# Patient Record
Sex: FEMALE | Race: BLACK OR AFRICAN AMERICAN | Employment: UNEMPLOYED
[De-identification: names, ages, dates, MRNs, and addresses within clinical notes are randomized per-mention and may not be internally consistent; named-entity substitution may affect disease eponyms.]

---

## 2024-09-24 ENCOUNTER — APPOINTMENT (OUTPATIENT)
Facility: HOSPITAL | Age: 39
DRG: 194 | End: 2024-09-24
Payer: MEDICAID

## 2024-09-24 ENCOUNTER — HOSPITAL ENCOUNTER (INPATIENT)
Facility: HOSPITAL | Age: 39
LOS: 7 days | Discharge: HOME | DRG: 194 | End: 2024-10-01
Attending: EMERGENCY MEDICINE | Admitting: INTERNAL MEDICINE
Payer: MEDICAID

## 2024-09-24 DIAGNOSIS — I50.33 ACUTE ON CHRONIC DIASTOLIC CONGESTIVE HEART FAILURE (HCC): ICD-10-CM

## 2024-09-24 DIAGNOSIS — I50.9 NEW ONSET OF CONGESTIVE HEART FAILURE (HCC): Primary | ICD-10-CM

## 2024-09-24 DIAGNOSIS — J96.01 ACUTE RESPIRATORY FAILURE WITH HYPOXIA: ICD-10-CM

## 2024-09-24 DIAGNOSIS — R06.02 SHORTNESS OF BREATH: ICD-10-CM

## 2024-09-24 LAB
ALBUMIN SERPL-MCNC: 2.7 G/DL (ref 3.4–5)
ALBUMIN/GLOB SERPL: 0.6 (ref 0.8–1.7)
ALP SERPL-CCNC: 119 U/L (ref 45–117)
ALT SERPL-CCNC: 73 U/L (ref 13–56)
ANION GAP SERPL CALC-SCNC: 7 MMOL/L (ref 3–18)
AST SERPL-CCNC: 144 U/L (ref 10–38)
BASOPHILS # BLD: 0 K/UL (ref 0–0.1)
BASOPHILS NFR BLD: 0 % (ref 0–2)
BILIRUB SERPL-MCNC: 0.7 MG/DL (ref 0.2–1)
BUN SERPL-MCNC: 26 MG/DL (ref 7–18)
BUN/CREAT SERPL: 14 (ref 12–20)
CALCIUM SERPL-MCNC: 8.4 MG/DL (ref 8.5–10.1)
CHLORIDE SERPL-SCNC: 107 MMOL/L (ref 100–111)
CO2 SERPL-SCNC: 22 MMOL/L (ref 21–32)
CREAT SERPL-MCNC: 1.83 MG/DL (ref 0.6–1.3)
DIFFERENTIAL METHOD BLD: ABNORMAL
EOSINOPHIL # BLD: 0 K/UL (ref 0–0.4)
EOSINOPHIL NFR BLD: 0 % (ref 0–5)
ERYTHROCYTE [DISTWIDTH] IN BLOOD BY AUTOMATED COUNT: 17.1 % (ref 11.6–14.5)
ETHANOL SERPL-MCNC: <3 MG/DL (ref 0–3)
FLUAV RNA SPEC QL NAA+PROBE: NOT DETECTED
FLUBV RNA SPEC QL NAA+PROBE: NOT DETECTED
GLOBULIN SER CALC-MCNC: 4.2 G/DL (ref 2–4)
GLUCOSE SERPL-MCNC: 184 MG/DL (ref 74–99)
HCT VFR BLD AUTO: 24.1 % (ref 35–45)
HGB BLD-MCNC: 7.4 G/DL (ref 12–16)
IMM GRANULOCYTES # BLD AUTO: 0.1 K/UL (ref 0–0.04)
IMM GRANULOCYTES NFR BLD AUTO: 0 % (ref 0–0.5)
LYMPHOCYTES # BLD: 1.9 K/UL (ref 0.9–3.6)
LYMPHOCYTES NFR BLD: 15 % (ref 21–52)
MAGNESIUM SERPL-MCNC: 1.8 MG/DL (ref 1.6–2.6)
MCH RBC QN AUTO: 25 PG (ref 24–34)
MCHC RBC AUTO-ENTMCNC: 30.7 G/DL (ref 31–37)
MCV RBC AUTO: 81.4 FL (ref 78–100)
MONOCYTES # BLD: 0.7 K/UL (ref 0.05–1.2)
MONOCYTES NFR BLD: 6 % (ref 3–10)
NEUTS SEG # BLD: 9.7 K/UL (ref 1.8–8)
NEUTS SEG NFR BLD: 78 % (ref 40–73)
NRBC # BLD: 0 K/UL (ref 0–0.01)
NRBC BLD-RTO: 0 PER 100 WBC
NT PRO BNP: ABNORMAL PG/ML (ref 0–450)
PLATELET # BLD AUTO: 314 K/UL (ref 135–420)
PMV BLD AUTO: 10.5 FL (ref 9.2–11.8)
POTASSIUM SERPL-SCNC: 4.7 MMOL/L (ref 3.5–5.5)
PROT SERPL-MCNC: 6.9 G/DL (ref 6.4–8.2)
RBC # BLD AUTO: 2.96 M/UL (ref 4.2–5.3)
SARS-COV-2 RNA RESP QL NAA+PROBE: NOT DETECTED
SODIUM SERPL-SCNC: 136 MMOL/L (ref 136–145)
SOURCE: NORMAL
TROPONIN I SERPL HS-MCNC: 73 NG/L (ref 0–54)
WBC # BLD AUTO: 12.4 K/UL (ref 4.6–13.2)

## 2024-09-24 PROCEDURE — 2700000000 HC OXYGEN THERAPY PER DAY

## 2024-09-24 PROCEDURE — 94762 N-INVAS EAR/PLS OXIMTRY CONT: CPT

## 2024-09-24 PROCEDURE — 84425 ASSAY OF VITAMIN B-1: CPT

## 2024-09-24 PROCEDURE — 83605 ASSAY OF LACTIC ACID: CPT

## 2024-09-24 PROCEDURE — 96375 TX/PRO/DX INJ NEW DRUG ADDON: CPT

## 2024-09-24 PROCEDURE — 5A0945A ASSISTANCE WITH RESPIRATORY VENTILATION, 24-96 CONSECUTIVE HOURS, HIGH NASAL FLOW/VELOCITY: ICD-10-PCS | Performed by: INTERNAL MEDICINE

## 2024-09-24 PROCEDURE — 71045 X-RAY EXAM CHEST 1 VIEW: CPT

## 2024-09-24 PROCEDURE — 82607 VITAMIN B-12: CPT

## 2024-09-24 PROCEDURE — 99285 EMERGENCY DEPT VISIT HI MDM: CPT

## 2024-09-24 PROCEDURE — 2580000003 HC RX 258: Performed by: EMERGENCY MEDICINE

## 2024-09-24 PROCEDURE — 83880 ASSAY OF NATRIURETIC PEPTIDE: CPT

## 2024-09-24 PROCEDURE — 87636 SARSCOV2 & INF A&B AMP PRB: CPT

## 2024-09-24 PROCEDURE — 82803 BLOOD GASES ANY COMBINATION: CPT

## 2024-09-24 PROCEDURE — 83735 ASSAY OF MAGNESIUM: CPT

## 2024-09-24 PROCEDURE — 94640 AIRWAY INHALATION TREATMENT: CPT

## 2024-09-24 PROCEDURE — 84484 ASSAY OF TROPONIN QUANT: CPT

## 2024-09-24 PROCEDURE — 93005 ELECTROCARDIOGRAM TRACING: CPT | Performed by: EMERGENCY MEDICINE

## 2024-09-24 PROCEDURE — 80053 COMPREHEN METABOLIC PANEL: CPT

## 2024-09-24 PROCEDURE — 96365 THER/PROPH/DIAG IV INF INIT: CPT

## 2024-09-24 PROCEDURE — 96374 THER/PROPH/DIAG INJ IV PUSH: CPT

## 2024-09-24 PROCEDURE — 6370000000 HC RX 637 (ALT 250 FOR IP): Performed by: EMERGENCY MEDICINE

## 2024-09-24 PROCEDURE — 6360000002 HC RX W HCPCS: Performed by: EMERGENCY MEDICINE

## 2024-09-24 PROCEDURE — 1100000000 HC RM PRIVATE

## 2024-09-24 PROCEDURE — 36600 WITHDRAWAL OF ARTERIAL BLOOD: CPT

## 2024-09-24 PROCEDURE — 99223 1ST HOSP IP/OBS HIGH 75: CPT | Performed by: INTERNAL MEDICINE

## 2024-09-24 PROCEDURE — 85025 COMPLETE CBC W/AUTO DIFF WBC: CPT

## 2024-09-24 PROCEDURE — 87040 BLOOD CULTURE FOR BACTERIA: CPT

## 2024-09-24 PROCEDURE — 82746 ASSAY OF FOLIC ACID SERUM: CPT

## 2024-09-24 PROCEDURE — 82077 ASSAY SPEC XCP UR&BREATH IA: CPT

## 2024-09-24 RX ORDER — IPRATROPIUM BROMIDE AND ALBUTEROL SULFATE 2.5; .5 MG/3ML; MG/3ML
1 SOLUTION RESPIRATORY (INHALATION)
Status: COMPLETED | OUTPATIENT
Start: 2024-09-24 | End: 2024-09-24

## 2024-09-24 RX ORDER — THIAMINE HYDROCHLORIDE 100 MG/ML
100 INJECTION, SOLUTION INTRAMUSCULAR; INTRAVENOUS ONCE
Status: COMPLETED | OUTPATIENT
Start: 2024-09-25 | End: 2024-09-25

## 2024-09-24 RX ORDER — INSULIN LISPRO 100 [IU]/ML
0-4 INJECTION, SOLUTION INTRAVENOUS; SUBCUTANEOUS NIGHTLY
Status: DISCONTINUED | OUTPATIENT
Start: 2024-09-25 | End: 2024-10-01 | Stop reason: HOSPADM

## 2024-09-24 RX ORDER — FUROSEMIDE 10 MG/ML
40 INJECTION INTRAMUSCULAR; INTRAVENOUS ONCE
Status: COMPLETED | OUTPATIENT
Start: 2024-09-24 | End: 2024-09-24

## 2024-09-24 RX ORDER — INSULIN LISPRO 100 [IU]/ML
0-4 INJECTION, SOLUTION INTRAVENOUS; SUBCUTANEOUS
Status: DISCONTINUED | OUTPATIENT
Start: 2024-09-25 | End: 2024-10-01 | Stop reason: HOSPADM

## 2024-09-24 RX ADMIN — AZITHROMYCIN DIHYDRATE 500 MG: 500 INJECTION, POWDER, LYOPHILIZED, FOR SOLUTION INTRAVENOUS at 22:37

## 2024-09-24 RX ADMIN — FUROSEMIDE 40 MG: 10 INJECTION, SOLUTION INTRAMUSCULAR; INTRAVENOUS at 23:20

## 2024-09-24 RX ADMIN — IPRATROPIUM BROMIDE AND ALBUTEROL SULFATE 1 DOSE: .5; 3 SOLUTION RESPIRATORY (INHALATION) at 20:21

## 2024-09-24 RX ADMIN — WATER 1000 MG: 1 INJECTION INTRAMUSCULAR; INTRAVENOUS; SUBCUTANEOUS at 22:30

## 2024-09-24 ASSESSMENT — PAIN DESCRIPTION - FREQUENCY
FREQUENCY: INTERMITTENT
FREQUENCY: CONTINUOUS

## 2024-09-24 ASSESSMENT — PAIN DESCRIPTION - PAIN TYPE
TYPE: ACUTE PAIN
TYPE: ACUTE PAIN

## 2024-09-24 ASSESSMENT — PAIN SCALES - GENERAL
PAINLEVEL_OUTOF10: 7
PAINLEVEL_OUTOF10: 10

## 2024-09-24 ASSESSMENT — PAIN DESCRIPTION - LOCATION
LOCATION: ABDOMEN;BACK
LOCATION: CHEST

## 2024-09-24 ASSESSMENT — PAIN - FUNCTIONAL ASSESSMENT: PAIN_FUNCTIONAL_ASSESSMENT: 0-10

## 2024-09-25 PROBLEM — I50.9 NEW ONSET OF CONGESTIVE HEART FAILURE (HCC): Status: ACTIVE | Noted: 2024-09-25

## 2024-09-25 LAB
ALBUMIN SERPL-MCNC: 2.5 G/DL (ref 3.4–5)
ALBUMIN/GLOB SERPL: 0.6 (ref 0.8–1.7)
ALP SERPL-CCNC: 108 U/L (ref 45–117)
ALT SERPL-CCNC: 60 U/L (ref 13–56)
AMPHET UR QL SCN: NEGATIVE
ANION GAP SERPL CALC-SCNC: 6 MMOL/L (ref 3–18)
APPEARANCE UR: CLEAR
ARTERIAL PATENCY WRIST A: POSITIVE
AST SERPL-CCNC: 101 U/L (ref 10–38)
BACTERIA URNS QL MICRO: ABNORMAL /HPF
BARBITURATES UR QL SCN: NEGATIVE
BASE DEFICIT BLD-SCNC: 3.8 MMOL/L
BASE DEFICIT BLD-SCNC: 4.1 MMOL/L
BASE DEFICIT BLDV-SCNC: 4.1 MMOL/L
BASE EXCESS BLD CALC-SCNC: 3.2 MMOL/L
BASOPHILS # BLD: 0 K/UL (ref 0–0.1)
BASOPHILS NFR BLD: 0 % (ref 0–2)
BDY SITE: ABNORMAL
BENZODIAZ UR QL: NEGATIVE
BILIRUB SERPL-MCNC: 0.6 MG/DL (ref 0.2–1)
BILIRUB UR QL: NEGATIVE
BODY TEMPERATURE: 98.9
BUN SERPL-MCNC: 26 MG/DL (ref 7–18)
BUN/CREAT SERPL: 16 (ref 12–20)
CALCIUM SERPL-MCNC: 8.8 MG/DL (ref 8.5–10.1)
CANNABINOIDS UR QL SCN: NEGATIVE
CHLORIDE SERPL-SCNC: 107 MMOL/L (ref 100–111)
CO2 SERPL-SCNC: 24 MMOL/L (ref 21–32)
COCAINE UR QL SCN: NEGATIVE
COLOR UR: YELLOW
CREAT SERPL-MCNC: 1.6 MG/DL (ref 0.6–1.3)
D DIMER PPP FEU-MCNC: 9.04 UG/ML(FEU)
DIFFERENTIAL METHOD BLD: ABNORMAL
EKG ATRIAL RATE: 112 BPM
EKG ATRIAL RATE: 116 BPM
EKG DIAGNOSIS: NORMAL
EKG DIAGNOSIS: NORMAL
EKG P AXIS: 27 DEGREES
EKG P AXIS: 28 DEGREES
EKG P-R INTERVAL: 144 MS
EKG P-R INTERVAL: 150 MS
EKG Q-T INTERVAL: 342 MS
EKG Q-T INTERVAL: 350 MS
EKG QRS DURATION: 72 MS
EKG QRS DURATION: 74 MS
EKG QTC CALCULATION (BAZETT): 475 MS
EKG QTC CALCULATION (BAZETT): 477 MS
EKG R AXIS: 30 DEGREES
EKG R AXIS: 30 DEGREES
EKG T AXIS: 35 DEGREES
EKG T AXIS: 37 DEGREES
EKG VENTRICULAR RATE: 112 BPM
EKG VENTRICULAR RATE: 116 BPM
EOSINOPHIL # BLD: 0.1 K/UL (ref 0–0.4)
EOSINOPHIL NFR BLD: 1 % (ref 0–5)
EPITH CASTS URNS QL MICRO: ABNORMAL /LPF (ref 0–5)
ERYTHROCYTE [DISTWIDTH] IN BLOOD BY AUTOMATED COUNT: 16.6 % (ref 11.6–14.5)
FOLATE SERPL-MCNC: 15.2 NG/ML (ref 3.1–17.5)
GAS FLOW.O2 O2 DELIVERY SYS: ABNORMAL
GAS FLOW.O2 SETTING OXYMISER: 10 BPM
GLOBULIN SER CALC-MCNC: 3.9 G/DL (ref 2–4)
GLUCOSE BLD STRIP.AUTO-MCNC: 118 MG/DL (ref 70–110)
GLUCOSE BLD STRIP.AUTO-MCNC: 122 MG/DL (ref 70–110)
GLUCOSE BLD STRIP.AUTO-MCNC: 142 MG/DL (ref 70–110)
GLUCOSE BLD STRIP.AUTO-MCNC: 157 MG/DL (ref 70–110)
GLUCOSE BLD STRIP.AUTO-MCNC: 162 MG/DL (ref 70–110)
GLUCOSE SERPL-MCNC: 150 MG/DL (ref 74–99)
GLUCOSE UR STRIP.AUTO-MCNC: NEGATIVE MG/DL
HCO3 BLD-SCNC: 20.1 MMOL/L (ref 21–28)
HCO3 BLD-SCNC: 20.5 MMOL/L (ref 21–28)
HCO3 BLD-SCNC: 27.6 MMOL/L (ref 21–28)
HCO3 BLDV-SCNC: 21.5 MMOL/L (ref 23–28)
HCT VFR BLD AUTO: 22.5 % (ref 35–45)
HGB BLD-MCNC: 7 G/DL (ref 12–16)
HGB UR QL STRIP: ABNORMAL
IMM GRANULOCYTES # BLD AUTO: 0.1 K/UL (ref 0–0.04)
IMM GRANULOCYTES NFR BLD AUTO: 0 % (ref 0–0.5)
IPAP/PIP/HIGH PEEP: 17
KETONES UR QL STRIP.AUTO: NEGATIVE MG/DL
LACTATE BLD-SCNC: 4.56 MMOL/L (ref 0.4–2)
LACTATE SERPL-SCNC: 2.3 MMOL/L (ref 0.4–2)
LEUKOCYTE ESTERASE UR QL STRIP.AUTO: ABNORMAL
LYMPHOCYTES # BLD: 1.4 K/UL (ref 0.9–3.6)
LYMPHOCYTES NFR BLD: 11 % (ref 21–52)
Lab: NORMAL
MCH RBC QN AUTO: 24.8 PG (ref 24–34)
MCHC RBC AUTO-ENTMCNC: 31.1 G/DL (ref 31–37)
MCV RBC AUTO: 79.8 FL (ref 78–100)
METHADONE UR QL: NEGATIVE
MONOCYTES # BLD: 0.8 K/UL (ref 0.05–1.2)
MONOCYTES NFR BLD: 6 % (ref 3–10)
NEUTS SEG # BLD: 10.1 K/UL (ref 1.8–8)
NEUTS SEG NFR BLD: 81 % (ref 40–73)
NITRITE UR QL STRIP.AUTO: NEGATIVE
NRBC # BLD: 0.02 K/UL (ref 0–0.01)
NRBC BLD-RTO: 0.2 PER 100 WBC
O2/TOTAL GAS SETTING VFR VENT: 100 %
OPIATES UR QL: NEGATIVE
PCO2 BLD: 32.4 MMHG (ref 35–48)
PCO2 BLD: 34.1 MMHG (ref 35–48)
PCO2 BLD: 40.5 MMHG (ref 35–48)
PCO2 BLDV: 40.8 MMHG (ref 41–51)
PCP UR QL: NEGATIVE
PEEP RESPIRATORY: 10 CMH2O
PH BLD: 7.39 (ref 7.35–7.45)
PH BLD: 7.4 (ref 7.35–7.45)
PH BLD: 7.44 (ref 7.35–7.45)
PH BLDV: 7.33 (ref 7.32–7.42)
PH UR STRIP: 7.5 (ref 5–8)
PLATELET # BLD AUTO: 294 K/UL (ref 135–420)
PMV BLD AUTO: 10.7 FL (ref 9.2–11.8)
PO2 BLD: 44 MMHG (ref 83–108)
PO2 BLD: 65 MMHG (ref 83–108)
PO2 BLD: 66 MMHG (ref 83–108)
PO2 BLDV: 22 MMHG (ref 25–40)
POTASSIUM SERPL-SCNC: 4.2 MMOL/L (ref 3.5–5.5)
PRESSURE SUPPORT SETTING VENT: 6 CMH2O
PROT SERPL-MCNC: 6.4 G/DL (ref 6.4–8.2)
PROT UR STRIP-MCNC: 30 MG/DL
RBC # BLD AUTO: 2.82 M/UL (ref 4.2–5.3)
RBC #/AREA URNS HPF: ABNORMAL /HPF (ref 0–5)
RESPIRATORY RATE, POC: 20 (ref 5–40)
RESPIRATORY RATE, POC: 21 (ref 5–40)
RESPIRATORY RATE, POC: 22 (ref 5–40)
SAO2 % BLD: 80.7 % (ref 92–97)
SAO2 % BLD: 92.3 % (ref 92–97)
SAO2 % BLD: 93.5 % (ref 92–97)
SAO2 % BLDV: 34 % (ref 65–88)
SERVICE CMNT-IMP: ABNORMAL
SODIUM SERPL-SCNC: 137 MMOL/L (ref 136–145)
SP GR UR REFRACTOMETRY: 1.01 (ref 1–1.03)
SPECIMEN TYPE: ABNORMAL
TROPONIN I SERPL HS-MCNC: 26 NG/L (ref 0–54)
TROPONIN I SERPL HS-MCNC: 42 NG/L (ref 0–54)
TROPONIN I SERPL HS-MCNC: 64 NG/L (ref 0–54)
TSH SERPL DL<=0.05 MIU/L-ACNC: 1.72 UIU/ML (ref 0.36–3.74)
UROBILINOGEN UR QL STRIP.AUTO: 1 EU/DL (ref 0.2–1)
VENTILATION MODE VENT: ABNORMAL
VIT B12 SERPL-MCNC: 344 PG/ML (ref 211–911)
VT SETTING VENT: 581 ML
WBC # BLD AUTO: 12.4 K/UL (ref 4.6–13.2)
WBC URNS QL MICRO: ABNORMAL /HPF (ref 0–4)

## 2024-09-25 PROCEDURE — 2580000003 HC RX 258: Performed by: INTERNAL MEDICINE

## 2024-09-25 PROCEDURE — 85025 COMPLETE CBC W/AUTO DIFF WBC: CPT

## 2024-09-25 PROCEDURE — 81001 URINALYSIS AUTO W/SCOPE: CPT

## 2024-09-25 PROCEDURE — 82962 GLUCOSE BLOOD TEST: CPT

## 2024-09-25 PROCEDURE — 2500000003 HC RX 250 WO HCPCS: Performed by: STUDENT IN AN ORGANIZED HEALTH CARE EDUCATION/TRAINING PROGRAM

## 2024-09-25 PROCEDURE — 6370000000 HC RX 637 (ALT 250 FOR IP): Performed by: INTERNAL MEDICINE

## 2024-09-25 PROCEDURE — 80053 COMPREHEN METABOLIC PANEL: CPT

## 2024-09-25 PROCEDURE — 94660 CPAP INITIATION&MGMT: CPT

## 2024-09-25 PROCEDURE — 6360000002 HC RX W HCPCS: Performed by: EMERGENCY MEDICINE

## 2024-09-25 PROCEDURE — 6360000002 HC RX W HCPCS: Performed by: STUDENT IN AN ORGANIZED HEALTH CARE EDUCATION/TRAINING PROGRAM

## 2024-09-25 PROCEDURE — 94664 DEMO&/EVAL PT USE INHALER: CPT

## 2024-09-25 PROCEDURE — 83605 ASSAY OF LACTIC ACID: CPT

## 2024-09-25 PROCEDURE — 2140000001 HC CVICU INTERMEDIATE R&B

## 2024-09-25 PROCEDURE — 36600 WITHDRAWAL OF ARTERIAL BLOOD: CPT

## 2024-09-25 PROCEDURE — 94761 N-INVAS EAR/PLS OXIMETRY MLT: CPT

## 2024-09-25 PROCEDURE — 6360000002 HC RX W HCPCS: Performed by: INTERNAL MEDICINE

## 2024-09-25 PROCEDURE — 36415 COLL VENOUS BLD VENIPUNCTURE: CPT

## 2024-09-25 PROCEDURE — 2700000000 HC OXYGEN THERAPY PER DAY

## 2024-09-25 PROCEDURE — 99223 1ST HOSP IP/OBS HIGH 75: CPT | Performed by: INTERNAL MEDICINE

## 2024-09-25 PROCEDURE — 85379 FIBRIN DEGRADATION QUANT: CPT

## 2024-09-25 PROCEDURE — 99222 1ST HOSP IP/OBS MODERATE 55: CPT | Performed by: INTERNAL MEDICINE

## 2024-09-25 PROCEDURE — 5A09457 ASSISTANCE WITH RESPIRATORY VENTILATION, 24-96 CONSECUTIVE HOURS, CONTINUOUS POSITIVE AIRWAY PRESSURE: ICD-10-PCS | Performed by: INTERNAL MEDICINE

## 2024-09-25 PROCEDURE — 93010 ELECTROCARDIOGRAM REPORT: CPT | Performed by: INTERNAL MEDICINE

## 2024-09-25 PROCEDURE — 84484 ASSAY OF TROPONIN QUANT: CPT

## 2024-09-25 PROCEDURE — 84443 ASSAY THYROID STIM HORMONE: CPT

## 2024-09-25 PROCEDURE — 80307 DRUG TEST PRSMV CHEM ANLYZR: CPT

## 2024-09-25 RX ORDER — METOCLOPRAMIDE 10 MG/1
10 TABLET ORAL
Status: DISCONTINUED | OUTPATIENT
Start: 2024-09-25 | End: 2024-09-25

## 2024-09-25 RX ORDER — MORPHINE SULFATE 2 MG/ML
2 INJECTION, SOLUTION INTRAMUSCULAR; INTRAVENOUS
Status: COMPLETED | OUTPATIENT
Start: 2024-09-25 | End: 2024-09-25

## 2024-09-25 RX ORDER — MORPHINE SULFATE 2 MG/ML
1 INJECTION, SOLUTION INTRAMUSCULAR; INTRAVENOUS EVERY 4 HOURS PRN
Status: COMPLETED | OUTPATIENT
Start: 2024-09-25 | End: 2024-09-26

## 2024-09-25 RX ORDER — POLYETHYLENE GLYCOL 3350 17 G/17G
17 POWDER, FOR SOLUTION ORAL DAILY PRN
Status: DISCONTINUED | OUTPATIENT
Start: 2024-09-25 | End: 2024-10-01 | Stop reason: HOSPADM

## 2024-09-25 RX ORDER — INSULIN GLARGINE 100 [IU]/ML
16 INJECTION, SOLUTION SUBCUTANEOUS EVERY MORNING
Status: DISCONTINUED | OUTPATIENT
Start: 2024-09-25 | End: 2024-09-27

## 2024-09-25 RX ORDER — FUROSEMIDE 10 MG/ML
40 INJECTION INTRAMUSCULAR; INTRAVENOUS 2 TIMES DAILY
Status: DISCONTINUED | OUTPATIENT
Start: 2024-09-25 | End: 2024-09-26

## 2024-09-25 RX ORDER — METOCLOPRAMIDE HYDROCHLORIDE 5 MG/ML
10 INJECTION INTRAMUSCULAR; INTRAVENOUS 3 TIMES DAILY
Status: DISCONTINUED | OUTPATIENT
Start: 2024-09-25 | End: 2024-09-29

## 2024-09-25 RX ORDER — POTASSIUM CHLORIDE 1500 MG/1
40 TABLET, EXTENDED RELEASE ORAL PRN
Status: DISCONTINUED | OUTPATIENT
Start: 2024-09-25 | End: 2024-10-01 | Stop reason: HOSPADM

## 2024-09-25 RX ORDER — ONDANSETRON 4 MG/1
4 TABLET, ORALLY DISINTEGRATING ORAL EVERY 8 HOURS PRN
Status: DISCONTINUED | OUTPATIENT
Start: 2024-09-25 | End: 2024-10-01 | Stop reason: HOSPADM

## 2024-09-25 RX ORDER — POTASSIUM CHLORIDE 7.45 MG/ML
10 INJECTION INTRAVENOUS PRN
Status: DISCONTINUED | OUTPATIENT
Start: 2024-09-25 | End: 2024-10-01 | Stop reason: HOSPADM

## 2024-09-25 RX ORDER — ACETAMINOPHEN 650 MG/1
650 SUPPOSITORY RECTAL EVERY 6 HOURS PRN
Status: DISCONTINUED | OUTPATIENT
Start: 2024-09-25 | End: 2024-10-01 | Stop reason: HOSPADM

## 2024-09-25 RX ORDER — ISOSORBIDE DINITRATE 5 MG/1
10 TABLET ORAL 3 TIMES DAILY
Status: DISCONTINUED | OUTPATIENT
Start: 2024-09-25 | End: 2024-10-01 | Stop reason: HOSPADM

## 2024-09-25 RX ORDER — AMLODIPINE BESYLATE 5 MG/1
5 TABLET ORAL DAILY
Status: DISCONTINUED | OUTPATIENT
Start: 2024-09-25 | End: 2024-09-29

## 2024-09-25 RX ORDER — ACETAMINOPHEN 325 MG/1
650 TABLET ORAL EVERY 6 HOURS PRN
Status: DISCONTINUED | OUTPATIENT
Start: 2024-09-25 | End: 2024-10-01 | Stop reason: HOSPADM

## 2024-09-25 RX ORDER — NITROGLYCERIN 20 MG/100ML
5-200 INJECTION INTRAVENOUS CONTINUOUS
Status: DISCONTINUED | OUTPATIENT
Start: 2024-09-25 | End: 2024-09-25

## 2024-09-25 RX ORDER — SODIUM CHLORIDE 0.9 % (FLUSH) 0.9 %
5-40 SYRINGE (ML) INJECTION EVERY 12 HOURS SCHEDULED
Status: DISCONTINUED | OUTPATIENT
Start: 2024-09-25 | End: 2024-10-01 | Stop reason: HOSPADM

## 2024-09-25 RX ORDER — SODIUM CHLORIDE 9 MG/ML
INJECTION, SOLUTION INTRAVENOUS PRN
Status: DISCONTINUED | OUTPATIENT
Start: 2024-09-25 | End: 2024-10-01 | Stop reason: HOSPADM

## 2024-09-25 RX ORDER — FUROSEMIDE 10 MG/ML
40 INJECTION INTRAMUSCULAR; INTRAVENOUS ONCE
Status: COMPLETED | OUTPATIENT
Start: 2024-09-25 | End: 2024-09-25

## 2024-09-25 RX ORDER — METOPROLOL TARTRATE 1 MG/ML
5 INJECTION, SOLUTION INTRAVENOUS ONCE
Status: COMPLETED | OUTPATIENT
Start: 2024-09-25 | End: 2024-09-25

## 2024-09-25 RX ORDER — TRAMADOL HYDROCHLORIDE 50 MG/1
50 TABLET ORAL EVERY 6 HOURS PRN
Status: DISCONTINUED | OUTPATIENT
Start: 2024-09-25 | End: 2024-10-01 | Stop reason: HOSPADM

## 2024-09-25 RX ORDER — MAGNESIUM SULFATE IN WATER 40 MG/ML
2000 INJECTION, SOLUTION INTRAVENOUS PRN
Status: DISCONTINUED | OUTPATIENT
Start: 2024-09-25 | End: 2024-10-01 | Stop reason: HOSPADM

## 2024-09-25 RX ORDER — SODIUM CHLORIDE 0.9 % (FLUSH) 0.9 %
5-40 SYRINGE (ML) INJECTION PRN
Status: DISCONTINUED | OUTPATIENT
Start: 2024-09-25 | End: 2024-10-01 | Stop reason: HOSPADM

## 2024-09-25 RX ORDER — ONDANSETRON 2 MG/ML
4 INJECTION INTRAMUSCULAR; INTRAVENOUS EVERY 6 HOURS PRN
Status: DISCONTINUED | OUTPATIENT
Start: 2024-09-25 | End: 2024-10-01 | Stop reason: HOSPADM

## 2024-09-25 RX ORDER — ENOXAPARIN SODIUM 100 MG/ML
40 INJECTION SUBCUTANEOUS DAILY
Status: DISCONTINUED | OUTPATIENT
Start: 2024-09-25 | End: 2024-10-01 | Stop reason: HOSPADM

## 2024-09-25 RX ADMIN — TRAMADOL HYDROCHLORIDE 50 MG: 50 TABLET ORAL at 06:05

## 2024-09-25 RX ADMIN — NITROGLYCERIN 1 INCH: 20 OINTMENT TOPICAL at 02:25

## 2024-09-25 RX ADMIN — MORPHINE SULFATE 2 MG: 2 INJECTION, SOLUTION INTRAMUSCULAR; INTRAVENOUS at 09:44

## 2024-09-25 RX ADMIN — FUROSEMIDE 40 MG: 10 INJECTION, SOLUTION INTRAMUSCULAR; INTRAVENOUS at 07:02

## 2024-09-25 RX ADMIN — FUROSEMIDE 40 MG: 10 INJECTION, SOLUTION INTRAMUSCULAR; INTRAVENOUS at 17:11

## 2024-09-25 RX ADMIN — METOCLOPRAMIDE HYDROCHLORIDE 10 MG: 5 INJECTION INTRAMUSCULAR; INTRAVENOUS at 21:27

## 2024-09-25 RX ADMIN — ENOXAPARIN SODIUM 40 MG: 100 INJECTION SUBCUTANEOUS at 08:24

## 2024-09-25 RX ADMIN — SODIUM CHLORIDE, PRESERVATIVE FREE 10 ML: 5 INJECTION INTRAVENOUS at 08:22

## 2024-09-25 RX ADMIN — METOPROLOL TARTRATE 5 MG: 5 INJECTION INTRAVENOUS at 15:23

## 2024-09-25 RX ADMIN — INSULIN GLARGINE 16 UNITS: 100 INJECTION, SOLUTION SUBCUTANEOUS at 08:25

## 2024-09-25 RX ADMIN — SODIUM CHLORIDE, PRESERVATIVE FREE 10 ML: 5 INJECTION INTRAVENOUS at 21:27

## 2024-09-25 RX ADMIN — NITROGLYCERIN 20 MCG/MIN: 20 INJECTION INTRAVENOUS at 06:31

## 2024-09-25 RX ADMIN — THIAMINE HYDROCHLORIDE 100 MG: 100 INJECTION, SOLUTION INTRAMUSCULAR; INTRAVENOUS at 02:32

## 2024-09-25 RX ADMIN — SODIUM CHLORIDE, PRESERVATIVE FREE 10 ML: 5 INJECTION INTRAVENOUS at 09:25

## 2024-09-25 RX ADMIN — METOCLOPRAMIDE 10 MG: 10 TABLET ORAL at 06:06

## 2024-09-25 RX ADMIN — FUROSEMIDE 40 MG: 10 INJECTION, SOLUTION INTRAMUSCULAR; INTRAVENOUS at 08:24

## 2024-09-25 RX ADMIN — METOCLOPRAMIDE HYDROCHLORIDE 10 MG: 5 INJECTION INTRAMUSCULAR; INTRAVENOUS at 14:02

## 2024-09-25 ASSESSMENT — PAIN DESCRIPTION - ONSET
ONSET: GRADUAL
ONSET: GRADUAL
ONSET: ON-GOING
ONSET: GRADUAL
ONSET: ON-GOING

## 2024-09-25 ASSESSMENT — PAIN SCALES - GENERAL
PAINLEVEL_OUTOF10: 7
PAINLEVEL_OUTOF10: 0
PAINLEVEL_OUTOF10: 1
PAINLEVEL_OUTOF10: 8
PAINLEVEL_OUTOF10: 7
PAINLEVEL_OUTOF10: 8

## 2024-09-25 ASSESSMENT — PAIN - FUNCTIONAL ASSESSMENT
PAIN_FUNCTIONAL_ASSESSMENT: ACTIVITIES ARE NOT PREVENTED
PAIN_FUNCTIONAL_ASSESSMENT: PREVENTS OR INTERFERES SOME ACTIVE ACTIVITIES AND ADLS
PAIN_FUNCTIONAL_ASSESSMENT: PREVENTS OR INTERFERES SOME ACTIVE ACTIVITIES AND ADLS
PAIN_FUNCTIONAL_ASSESSMENT: ACTIVITIES ARE NOT PREVENTED
PAIN_FUNCTIONAL_ASSESSMENT: PREVENTS OR INTERFERES SOME ACTIVE ACTIVITIES AND ADLS

## 2024-09-25 ASSESSMENT — PAIN DESCRIPTION - DESCRIPTORS
DESCRIPTORS: ACHING
DESCRIPTORS: SHARP
DESCRIPTORS: ACHING
DESCRIPTORS: THROBBING
DESCRIPTORS: ACHING

## 2024-09-25 ASSESSMENT — PAIN DESCRIPTION - FREQUENCY
FREQUENCY: CONTINUOUS
FREQUENCY: INTERMITTENT
FREQUENCY: INTERMITTENT
FREQUENCY: CONTINUOUS
FREQUENCY: INTERMITTENT

## 2024-09-25 ASSESSMENT — PAIN DESCRIPTION - ORIENTATION
ORIENTATION: LOWER
ORIENTATION: UPPER;LOWER;MID
ORIENTATION: LOWER
ORIENTATION: LOWER
ORIENTATION: MID

## 2024-09-25 ASSESSMENT — PAIN DESCRIPTION - PAIN TYPE
TYPE: ACUTE PAIN

## 2024-09-25 ASSESSMENT — PAIN DESCRIPTION - LOCATION
LOCATION: BUTTOCKS;BACK
LOCATION: BACK
LOCATION: CHEST
LOCATION: BACK;BUTTOCKS
LOCATION: BACK;BUTTOCKS

## 2024-09-25 NOTE — ED NOTES
Patient pulled off high flow oxygen, vital sign monitors and was not redirectable. Educated patient that the high flow was necessary due to her oxygen level being 74% on room air. Patient argued I was rude and disrespectful and called me obscenities. This nurse offered to call her mother for her. Patient refused and said she had to call her. Spoke with Wero Rn, Charge Nurse and explained situation. Wero said she would speak to the patient. Patient and Wero are currently at the nurses station.

## 2024-09-25 NOTE — ED NOTES
Patient removed vital sign monitoring devices. Patient is demanding to use phone to call mother. Patient is waiting by charge nurses station.

## 2024-09-25 NOTE — ED NOTES
Assumed care of patient. Patient paying semi velasquez, eyes open wearing home clothes. NRB at 15L. RT at bedside. No respiratory distress observed. Skin is warm and dry to touch.

## 2024-09-25 NOTE — ED NOTES
Patient endorses she needs to urinate. Patient is afraid to urinate. This nurse encouraged patient and reassured patient the Purewick will work and I will clean her up as needed.

## 2024-09-25 NOTE — CARE COORDINATION
SW attempted to complete the patient assessment, however, she was receiving patient care from RT at the time. ALVARAOD will try later today.       Millie Puckett, MSW     [phone]

## 2024-09-25 NOTE — ED NOTES
Patient found laying on the floor with head and arms across bed. I called for help. Wero GRAHAM and myself pulled her up on to the bed with Dr. Zee. Patient repositioned. High flow placed on patient. Vital signs monitored by bedside monitor. Hospital gown placed and purewick placed. EKG completed. Educated patient on the importance to keep High Flow NC on her.

## 2024-09-25 NOTE — ED PROVIDER NOTES
with improvement [ROWENA]   2329 EKG interpreted by me == EKG interpretation: sinus tachycardia rate of 116  bpm, normal axis no ST elevation or depression.  Overall sinus tachycardia no acute ischemic changes   [ROWENA]   2331 Patient was reassessed, and now patient stating that she really had only symptoms developing over the past 3 days.  No known history of cardiac failure or known renal failure.  No family history of early onset heart disease.  She is denying any chest pain in the last few days but when I went in the room she was having chest pain.  Her sats are hovering in the 90-93 range for the most part on the high flow on 40 L, and at 90%.  She will certainly need admission..  Question alcoholic cardiomyopathy or nutritional deficiency or possibly cocaine induced although she does not endorse using cocaine we have not gotten a drug screen as of yet. [ROWENA]   2345 Discussed with hospitalist Dr. Lawson for admission.  Given IV antibiotics over concern for possible superimposed infection of my suspicion for that is lower than due to edema. [ROWENA]   2349 Initial EKG interpreted by me EKG interpretation: sinus tachycardia rate of 112  bpm, normal axis no ST elevation or depression.  Overall sinus tachycardia no acute ischemic changes   [ROWENA]      ED Course User Index  [ROWENA] Nito Zee,        SEPSIS Reassessment: Sepsis reassessment not applicable    Clinical Management Tools:  Not Applicable    Patient was given the following medications:  Medications   sodium chloride flush 0.9 % injection 5-40 mL (10 mLs IntraVENous Given 9/25/24 0925)   sodium chloride flush 0.9 % injection 5-40 mL (10 mLs IntraVENous Given 9/25/24 0822)   0.9 % sodium chloride infusion (has no administration in time range)   potassium chloride (KLOR-CON M) extended release tablet 40 mEq (has no administration in time range)     Or   potassium bicarb-citric acid (EFFER-K) effervescent tablet 40 mEq (has no administration in time range)     Or

## 2024-09-25 NOTE — ED NOTES
Patient pulled off vital sign monitoring devices. Endorses MD said it was ok for her to take off BIPAP. Patient refuses to keep mask on. Educated patient about the importance of the face mask.

## 2024-09-25 NOTE — H&P
HISTORY & PHYSICAL      Patient: Aileen Bonilla MRN: [id number]  [facility]: [id number]    YOB: 1985  Age: 39 y.o.  Sex: female    DOA: 9/24/2024 LOS:  LOS: 0 days        DOA: 9/24/2024        Assessment/Plan     39 years old presented with shortness of breath, lethargy, leg edema and respiratory distress    -Acute hypoxic respiratory failure, suspect secondary to new diagnosis of heart failure  -Chronic renal insufficiency, stage IIIb  -Diabetes mellitus, with gastroparesis  -Alcoholism, she presented from rehab    The patient admitted to stepdown  Diuresis with IV Lasix  Monitor intake, output, renal function, and electrolytes  Trend cardiac enzymes  Echocardiogram  Continue oxygen, and wean off as tolerated  Cardiology consult in a.m.  Continue Lantus insulin, with before every meal sliding scale  Continue other home meds    Admission plan was discussed      Patient Active Problem List   Diagnosis    Acute decompensated heart failure (HCC)     History of Presenting Illness:  39 years old who is admitted for suspected new diagnosis of heart failure..  She presented from alcohol rehab with complaint of having severe shortness of breath, orthopnea, and leg edema.  No chest pain, fever, or cough.  She was reported hypoxic in the 60s, required high flow oxygen in the ED.  Chest x-ray showed cardiomegaly and bilateral pulmonary edema.  ABG on nonrebreather mask showed pH of 7.38, pCO2 of 78, and pO2 of 64.  She received IV Lasix in ED and admitted to stepdown for further monitoring.  She has medical he significant for diabetes mellitus type 2 insulin treated, with gastroparesis and EtOH abuse.  She has no other concerns.    Social History     Socioeconomic History    Marital status:      Social Determinants of Health     Food Insecurity: No Food Insecurity (7/11/2024)    Received from [facility] Vital Sign     Worried About Running Out of Food in the Last Year: Never true     Ran Out 
09/25/24 12:09 AM   Result Value Ref Range    Troponin, High Sensitivity 64 (H) 0 - 54 ng/L   Lactic Acid    Collection Time: 09/25/24 12:09 AM   Result Value Ref Range    Lactic Acid, Plasma 2.3 (HH) 0.4 - 2.0 MMOL/L       Imaging Reviewed:  XR CHEST PORTABLE    Result Date: 9/24/2024  1. Cardiomegaly and severe pulmonary edema. Superimposed infiltrate/aspiration not completely excluded. Electronically signed by Sebas Jiménez       EKG  Sinus tachy 116    Kofi Sal MD  9/25/2024, 2:21 AM        Disclaimer: Sections of this note are dictated using utilizing voice recognition software.  Minor typographical errors may be present. If questions arise, please do not hesitate to contact me or call our department.

## 2024-09-26 ENCOUNTER — APPOINTMENT (OUTPATIENT)
Facility: HOSPITAL | Age: 39
DRG: 194 | End: 2024-09-26
Payer: MEDICAID

## 2024-09-26 PROBLEM — I10 PRIMARY HYPERTENSION: Status: ACTIVE | Noted: 2024-09-26

## 2024-09-26 PROBLEM — K31.84 DIABETIC GASTROPARESIS (HCC): Status: ACTIVE | Noted: 2024-09-26

## 2024-09-26 PROBLEM — I50.9 ACUTE DECOMPENSATED HEART FAILURE (HCC): Status: RESOLVED | Noted: 2024-09-24 | Resolved: 2024-09-26

## 2024-09-26 PROBLEM — N17.1 ACUTE RENAL FAILURE WITH ACUTE RENAL CORTICAL NECROSIS SUPERIMPOSED ON STAGE 3A CHRONIC KIDNEY DISEASE (HCC): Status: ACTIVE | Noted: 2024-09-26

## 2024-09-26 PROBLEM — E87.20 LACTIC ACIDEMIA: Status: ACTIVE | Noted: 2024-09-26

## 2024-09-26 PROBLEM — R82.71 ASYMPTOMATIC BACTERIURIA: Status: ACTIVE | Noted: 2024-09-26

## 2024-09-26 PROBLEM — I21.4 NSTEMI (NON-ST ELEVATED MYOCARDIAL INFARCTION) (HCC): Status: ACTIVE | Noted: 2024-09-26

## 2024-09-26 PROBLEM — I50.9 NEW ONSET OF CONGESTIVE HEART FAILURE (HCC): Status: RESOLVED | Noted: 2024-09-25 | Resolved: 2024-09-26

## 2024-09-26 PROBLEM — I50.33 ACUTE ON CHRONIC HEART FAILURE WITH PRESERVED EJECTION FRACTION (HCC): Status: ACTIVE | Noted: 2024-09-26

## 2024-09-26 PROBLEM — K31.84 GASTROPARESIS: Status: ACTIVE | Noted: 2024-09-26

## 2024-09-26 PROBLEM — J96.01 ACUTE RESPIRATORY FAILURE WITH HYPOXIA: Status: ACTIVE | Noted: 2024-09-26

## 2024-09-26 PROBLEM — E11.43 TYPE 2 DIABETES MELLITUS WITH DIABETIC AUTONOMIC NEUROPATHY, WITH LONG-TERM CURRENT USE OF INSULIN (HCC): Status: ACTIVE | Noted: 2024-09-26

## 2024-09-26 PROBLEM — Z79.4 TYPE 2 DIABETES MELLITUS WITH DIABETIC AUTONOMIC NEUROPATHY, WITH LONG-TERM CURRENT USE OF INSULIN (HCC): Status: ACTIVE | Noted: 2024-09-26

## 2024-09-26 PROBLEM — N18.31 ACUTE RENAL FAILURE WITH ACUTE RENAL CORTICAL NECROSIS SUPERIMPOSED ON STAGE 3A CHRONIC KIDNEY DISEASE (HCC): Status: ACTIVE | Noted: 2024-09-26

## 2024-09-26 LAB
ALBUMIN SERPL-MCNC: 2.5 G/DL (ref 3.4–5)
ALBUMIN/GLOB SERPL: 0.6 (ref 0.8–1.7)
ALP SERPL-CCNC: 108 U/L (ref 45–117)
ALT SERPL-CCNC: 47 U/L (ref 13–56)
ANION GAP BLD CALC-SCNC: ABNORMAL MMOL/L (ref 10–20)
ANION GAP SERPL CALC-SCNC: 6 MMOL/L (ref 3–18)
ARTERIAL PATENCY WRIST A: POSITIVE
AST SERPL-CCNC: 60 U/L (ref 10–38)
BASE EXCESS BLD CALC-SCNC: 4 MMOL/L
BDY SITE: ABNORMAL
BILIRUB SERPL-MCNC: 0.6 MG/DL (ref 0.2–1)
BUN SERPL-MCNC: 31 MG/DL (ref 7–18)
BUN/CREAT SERPL: 18 (ref 12–20)
CA-I BLD-MCNC: 1.14 MMOL/L (ref 1.15–1.33)
CALCIUM SERPL-MCNC: 8.9 MG/DL (ref 8.5–10.1)
CHLORIDE BLD-SCNC: 105 MMOL/L (ref 98–107)
CHLORIDE SERPL-SCNC: 105 MMOL/L (ref 100–111)
CO2 BLD-SCNC: 28 MMOL/L (ref 22–29)
CO2 SERPL-SCNC: 29 MMOL/L (ref 21–32)
CREAT BLD-MCNC: 1.49 MG/DL (ref 0.6–1.3)
CREAT SERPL-MCNC: 1.71 MG/DL (ref 0.6–1.3)
ECHO AO ROOT DIAM: 2.9 CM
ECHO AO ROOT INDEX: 1.5 CM/M2
ECHO BSA: 1.96 M2
ECHO LV EF PHYSICIAN: 55 %
ECHO LV FRACTIONAL SHORTENING: 28 % (ref 28–44)
ECHO LV INTERNAL DIMENSION DIASTOLE INDEX: 2.38 CM/M2
ECHO LV INTERNAL DIMENSION DIASTOLIC: 4.6 CM (ref 3.9–5.3)
ECHO LV INTERNAL DIMENSION SYSTOLIC INDEX: 1.71 CM/M2
ECHO LV INTERNAL DIMENSION SYSTOLIC: 3.3 CM
ECHO LV IVSD: 1 CM (ref 0.6–0.9)
ECHO LV MASS 2D: 169.9 G (ref 67–162)
ECHO LV MASS INDEX 2D: 88 G/M2 (ref 43–95)
ECHO LV POSTERIOR WALL DIASTOLIC: 1.1 CM (ref 0.6–0.9)
ECHO LV RELATIVE WALL THICKNESS RATIO: 0.48
ECHO LVOT AREA: 3.5 CM2
ECHO LVOT DIAM: 2.1 CM
ECHO LVOT MEAN GRADIENT: 3 MMHG
ECHO LVOT PEAK GRADIENT: 5 MMHG
ECHO LVOT PEAK VELOCITY: 1.2 M/S
ECHO LVOT STROKE VOLUME INDEX: 37.7 ML/M2
ECHO LVOT SV: 72.7 ML
ECHO LVOT VTI: 21 CM
ECHO RA AREA 4C: 16.8 CM2
ECHO RV BASAL DIMENSION: 3.6 CM
ECHO RV FREE WALL PEAK S': 14.8 CM/S
ECHO RV TAPSE: 2.2 CM (ref 1.7–?)
ERYTHROCYTE [DISTWIDTH] IN BLOOD BY AUTOMATED COUNT: 16.7 % (ref 11.6–14.5)
FIO2 ON VENT: 80 %
GAS FLOW.O2 O2 DELIVERY SYS: ABNORMAL
GLOBULIN SER CALC-MCNC: 4.4 G/DL (ref 2–4)
GLUCOSE BLD STRIP.AUTO-MCNC: 115 MG/DL (ref 70–110)
GLUCOSE BLD STRIP.AUTO-MCNC: 134 MG/DL (ref 70–110)
GLUCOSE BLD STRIP.AUTO-MCNC: 89 MG/DL (ref 70–110)
GLUCOSE BLD STRIP.AUTO-MCNC: 92 MG/DL (ref 70–110)
GLUCOSE BLD-MCNC: 76 MG/DL (ref 74–99)
GLUCOSE SERPL-MCNC: 84 MG/DL (ref 74–99)
HCG UR QL: NEGATIVE
HCO3 BLD-SCNC: 28.3 MMOL/L (ref 21–28)
HCT VFR BLD AUTO: 22.6 % (ref 35–45)
HGB BLD-MCNC: 7 G/DL (ref 12–16)
LACTATE BLD-SCNC: 0.79 MMOL/L (ref 0.4–2)
LACTATE SERPL-SCNC: 0.8 MMOL/L (ref 0.4–2)
MCH RBC QN AUTO: 24.6 PG (ref 24–34)
MCHC RBC AUTO-ENTMCNC: 31 G/DL (ref 31–37)
MCV RBC AUTO: 79.6 FL (ref 78–100)
NRBC # BLD: 0 K/UL (ref 0–0.01)
NRBC BLD-RTO: 0 PER 100 WBC
PCO2 BLD: 40.7 MMHG (ref 35–48)
PEEP RESPIRATORY: 10
PH BLD: 7.45 (ref 7.35–7.45)
PLATELET # BLD AUTO: 312 K/UL (ref 135–420)
PMV BLD AUTO: 10.6 FL (ref 9.2–11.8)
PO2 BLD: 42 MMHG (ref 83–108)
POTASSIUM BLD-SCNC: 3.5 MMOL/L (ref 3.5–5.1)
POTASSIUM SERPL-SCNC: 3.5 MMOL/L (ref 3.5–5.5)
PRESSURE SUPPORT SETTING VENT: 6
PROT SERPL-MCNC: 6.9 G/DL (ref 6.4–8.2)
RBC # BLD AUTO: 2.84 M/UL (ref 4.2–5.3)
SAO2 % BLD: 79 % (ref 94–98)
SERVICE CMNT-IMP: ABNORMAL
SODIUM BLD-SCNC: 141 MMOL/L (ref 136–145)
SODIUM SERPL-SCNC: 140 MMOL/L (ref 136–145)
SPECIMEN SITE: ABNORMAL
VENTILATION MODE VENT: ABNORMAL
VT SETTING VENT: 479
WBC # BLD AUTO: 15.5 K/UL (ref 4.6–13.2)

## 2024-09-26 PROCEDURE — 82962 GLUCOSE BLOOD TEST: CPT

## 2024-09-26 PROCEDURE — 2580000003 HC RX 258: Performed by: STUDENT IN AN ORGANIZED HEALTH CARE EDUCATION/TRAINING PROGRAM

## 2024-09-26 PROCEDURE — 99291 CRITICAL CARE FIRST HOUR: CPT | Performed by: HOSPITALIST

## 2024-09-26 PROCEDURE — 99222 1ST HOSP IP/OBS MODERATE 55: CPT | Performed by: INTERNAL MEDICINE

## 2024-09-26 PROCEDURE — 6360000002 HC RX W HCPCS: Performed by: STUDENT IN AN ORGANIZED HEALTH CARE EDUCATION/TRAINING PROGRAM

## 2024-09-26 PROCEDURE — 84132 ASSAY OF SERUM POTASSIUM: CPT

## 2024-09-26 PROCEDURE — 71045 X-RAY EXAM CHEST 1 VIEW: CPT

## 2024-09-26 PROCEDURE — 81025 URINE PREGNANCY TEST: CPT

## 2024-09-26 PROCEDURE — 82947 ASSAY GLUCOSE BLOOD QUANT: CPT

## 2024-09-26 PROCEDURE — 2580000003 HC RX 258: Performed by: INTERNAL MEDICINE

## 2024-09-26 PROCEDURE — 83605 ASSAY OF LACTIC ACID: CPT

## 2024-09-26 PROCEDURE — 6360000002 HC RX W HCPCS: Performed by: INTERNAL MEDICINE

## 2024-09-26 PROCEDURE — 2140000001 HC CVICU INTERMEDIATE R&B

## 2024-09-26 PROCEDURE — 84295 ASSAY OF SERUM SODIUM: CPT

## 2024-09-26 PROCEDURE — 36600 WITHDRAWAL OF ARTERIAL BLOOD: CPT

## 2024-09-26 PROCEDURE — 6370000000 HC RX 637 (ALT 250 FOR IP): Performed by: INTERNAL MEDICINE

## 2024-09-26 PROCEDURE — 85027 COMPLETE CBC AUTOMATED: CPT

## 2024-09-26 PROCEDURE — 80053 COMPREHEN METABOLIC PANEL: CPT

## 2024-09-26 PROCEDURE — 99233 SBSQ HOSP IP/OBS HIGH 50: CPT | Performed by: STUDENT IN AN ORGANIZED HEALTH CARE EDUCATION/TRAINING PROGRAM

## 2024-09-26 PROCEDURE — 6370000000 HC RX 637 (ALT 250 FOR IP): Performed by: STUDENT IN AN ORGANIZED HEALTH CARE EDUCATION/TRAINING PROGRAM

## 2024-09-26 PROCEDURE — 85014 HEMATOCRIT: CPT

## 2024-09-26 PROCEDURE — 82803 BLOOD GASES ANY COMBINATION: CPT

## 2024-09-26 PROCEDURE — 93308 TTE F-UP OR LMTD: CPT

## 2024-09-26 PROCEDURE — 36415 COLL VENOUS BLD VENIPUNCTURE: CPT

## 2024-09-26 PROCEDURE — 82330 ASSAY OF CALCIUM: CPT

## 2024-09-26 RX ORDER — METOPROLOL TARTRATE 1 MG/ML
5 INJECTION, SOLUTION INTRAVENOUS EVERY 6 HOURS PRN
Status: DISCONTINUED | OUTPATIENT
Start: 2024-09-26 | End: 2024-09-28

## 2024-09-26 RX ORDER — DEXTROSE MONOHYDRATE 100 MG/ML
INJECTION, SOLUTION INTRAVENOUS CONTINUOUS
Status: DISCONTINUED | OUTPATIENT
Start: 2024-09-26 | End: 2024-09-27

## 2024-09-26 RX ORDER — MORPHINE SULFATE 2 MG/ML
2 INJECTION, SOLUTION INTRAMUSCULAR; INTRAVENOUS EVERY 4 HOURS PRN
Status: DISCONTINUED | OUTPATIENT
Start: 2024-09-26 | End: 2024-10-01 | Stop reason: HOSPADM

## 2024-09-26 RX ORDER — FUROSEMIDE 10 MG/ML
40 INJECTION INTRAMUSCULAR; INTRAVENOUS EVERY 8 HOURS
Status: DISCONTINUED | OUTPATIENT
Start: 2024-09-26 | End: 2024-09-27

## 2024-09-26 RX ORDER — FOLIC ACID 1 MG/1
1 TABLET ORAL DAILY
Status: DISCONTINUED | OUTPATIENT
Start: 2024-09-26 | End: 2024-10-01 | Stop reason: HOSPADM

## 2024-09-26 RX ORDER — THIAMINE HCL 50 MG
100 TABLET ORAL DAILY
Status: DISCONTINUED | OUTPATIENT
Start: 2024-09-26 | End: 2024-10-01 | Stop reason: HOSPADM

## 2024-09-26 RX ORDER — TRAZODONE HYDROCHLORIDE 50 MG/1
50 TABLET, FILM COATED ORAL ONCE
Status: COMPLETED | OUTPATIENT
Start: 2024-09-26 | End: 2024-09-28

## 2024-09-26 RX ADMIN — FUROSEMIDE 40 MG: 10 INJECTION, SOLUTION INTRAMUSCULAR; INTRAVENOUS at 16:58

## 2024-09-26 RX ADMIN — MORPHINE SULFATE 1 MG: 2 INJECTION, SOLUTION INTRAMUSCULAR; INTRAVENOUS at 12:40

## 2024-09-26 RX ADMIN — METOCLOPRAMIDE HYDROCHLORIDE 10 MG: 5 INJECTION INTRAMUSCULAR; INTRAVENOUS at 08:18

## 2024-09-26 RX ADMIN — SODIUM CHLORIDE, PRESERVATIVE FREE 10 ML: 5 INJECTION INTRAVENOUS at 08:18

## 2024-09-26 RX ADMIN — METOCLOPRAMIDE HYDROCHLORIDE 10 MG: 5 INJECTION INTRAMUSCULAR; INTRAVENOUS at 14:19

## 2024-09-26 RX ADMIN — ISOSORBIDE DINITRATE 10 MG: 5 TABLET ORAL at 21:42

## 2024-09-26 RX ADMIN — MORPHINE SULFATE 1 MG: 2 INJECTION, SOLUTION INTRAMUSCULAR; INTRAVENOUS at 08:31

## 2024-09-26 RX ADMIN — INSULIN GLARGINE 16 UNITS: 100 INJECTION, SOLUTION SUBCUTANEOUS at 08:15

## 2024-09-26 RX ADMIN — FUROSEMIDE 40 MG: 10 INJECTION, SOLUTION INTRAMUSCULAR; INTRAVENOUS at 08:18

## 2024-09-26 RX ADMIN — ENOXAPARIN SODIUM 40 MG: 100 INJECTION SUBCUTANEOUS at 08:16

## 2024-09-26 RX ADMIN — MORPHINE SULFATE 2 MG: 2 INJECTION, SOLUTION INTRAMUSCULAR; INTRAVENOUS at 16:59

## 2024-09-26 RX ADMIN — MORPHINE SULFATE 1 MG: 2 INJECTION, SOLUTION INTRAMUSCULAR; INTRAVENOUS at 00:25

## 2024-09-26 RX ADMIN — SODIUM CHLORIDE, PRESERVATIVE FREE 10 ML: 5 INJECTION INTRAVENOUS at 21:40

## 2024-09-26 RX ADMIN — DEXTROSE MONOHYDRATE: 100 INJECTION, SOLUTION INTRAVENOUS at 14:24

## 2024-09-26 RX ADMIN — METOCLOPRAMIDE HYDROCHLORIDE 10 MG: 5 INJECTION INTRAMUSCULAR; INTRAVENOUS at 21:42

## 2024-09-26 ASSESSMENT — PAIN SCALES - GENERAL
PAINLEVEL_OUTOF10: 0
PAINLEVEL_OUTOF10: 8
PAINLEVEL_OUTOF10: 7
PAINLEVEL_OUTOF10: 8
PAINLEVEL_OUTOF10: 6
PAINLEVEL_OUTOF10: 8
PAINLEVEL_OUTOF10: 8
PAINLEVEL_OUTOF10: 0
PAINLEVEL_OUTOF10: 0

## 2024-09-26 ASSESSMENT — PAIN DESCRIPTION - ONSET
ONSET: ON-GOING
ONSET: GRADUAL
ONSET: ON-GOING
ONSET: GRADUAL

## 2024-09-26 ASSESSMENT — PAIN DESCRIPTION - ORIENTATION
ORIENTATION: UPPER
ORIENTATION: UPPER
ORIENTATION: LOWER
ORIENTATION: LOWER
ORIENTATION: LEFT;RIGHT;PROXIMAL
ORIENTATION: LEFT;RIGHT;POSTERIOR;LOWER
ORIENTATION: LOWER;POSTERIOR

## 2024-09-26 ASSESSMENT — PAIN - FUNCTIONAL ASSESSMENT
PAIN_FUNCTIONAL_ASSESSMENT: PREVENTS OR INTERFERES SOME ACTIVE ACTIVITIES AND ADLS

## 2024-09-26 ASSESSMENT — PAIN DESCRIPTION - PAIN TYPE
TYPE: CHRONIC PAIN
TYPE: ACUTE PAIN
TYPE: CHRONIC PAIN
TYPE: CHRONIC PAIN

## 2024-09-26 ASSESSMENT — PAIN DESCRIPTION - FREQUENCY
FREQUENCY: INTERMITTENT
FREQUENCY: CONTINUOUS

## 2024-09-26 ASSESSMENT — PAIN DESCRIPTION - LOCATION
LOCATION: BUTTOCKS;BACK
LOCATION: BACK;BUTTOCKS;LEG
LOCATION: BACK
LOCATION: BUTTOCKS;BACK
LOCATION: BACK;BUTTOCKS
LOCATION: BACK;BUTTOCKS
LOCATION: BACK

## 2024-09-26 ASSESSMENT — PAIN DESCRIPTION - DESCRIPTORS
DESCRIPTORS: ACHING

## 2024-09-26 NOTE — SUBJECTIVE & OBJECTIVE
Subjective:     ***    Objective:     Vitals:    09/26/24 0700 09/26/24 0715 09/26/24 0818 09/26/24 0831   BP:  118/68 (!) 140/85    Pulse: (!) 108 (!) 114     Resp:  23 22   Temp:  98.7 °F (37.1 °C)     TempSrc:  Axillary     SpO2:       Weight:       Height:            Intake andOutput:  Current Shift: No intake/output data recorded.  Last three shifts: 09/24 1901 - 09/26 0700  In: -   Out: 5275 [Urine:5275]     Physical Exam      Medications:  Current Facility-Administered Medications   Medication Dose Route Frequency    vitamin B-1 tablet 100 mg  100 mg Oral Daily    folic acid (FOLVITE) tablet 1 mg  1 mg Oral Daily    sodium chloride flush 0.9 % injection 5-40 mL  5-40 mL IntraVENous 2 times per day    sodium chloride flush 0.9 % injection 5-40 mL  5-40 mL IntraVENous PRN    0.9 % sodium chloride infusion   IntraVENous PRN    potassium chloride (KLOR-CON M) extended release tablet 40 mEq  40 mEq Oral PRN    Or    potassium bicarb-citric acid (EFFER-K) effervescent tablet 40 mEq  40 mEq Oral PRN    Or    potassium chloride 10 mEq/100 mL IVPB (Peripheral Line)  10 mEq IntraVENous PRN    magnesium sulfate 2000 mg in 50 mL IVPB premix  2,000 mg IntraVENous PRN    enoxaparin (LOVENOX) injection 40 mg  40 mg SubCUTAneous Daily    ondansetron (ZOFRAN-ODT) disintegrating tablet 4 mg  4 mg Oral Q8H PRN    Or    ondansetron (ZOFRAN) injection 4 mg  4 mg IntraVENous Q6H PRN    polyethylene glycol (GLYCOLAX) packet 17 g  17 g Oral Daily PRN    acetaminophen (TYLENOL) tablet 650 mg  650 mg Oral Q6H PRN    Or    acetaminophen (TYLENOL) suppository 650 mg  650 mg Rectal Q6H PRN    insulin glargine (LANTUS) injection vial 16 Units  16 Units SubCUTAneous QAM    furosemide (LASIX) injection 40 mg  40 mg IntraVENous BID    traMADol (ULTRAM) tablet 50 mg  50 mg Oral Q6H PRN    metoclopramide (REGLAN) injection 10 mg  10 mg IntraVENous TID    amLODIPine (NORVASC) tablet 5 mg  5 mg Oral Daily    isosorbide dinitrate (ISORDIL)

## 2024-09-27 ENCOUNTER — APPOINTMENT (OUTPATIENT)
Facility: HOSPITAL | Age: 39
DRG: 194 | End: 2024-09-27
Attending: STUDENT IN AN ORGANIZED HEALTH CARE EDUCATION/TRAINING PROGRAM
Payer: MEDICAID

## 2024-09-27 LAB
ANION GAP SERPL CALC-SCNC: 9 MMOL/L (ref 3–18)
BASOPHILS # BLD: 0 K/UL (ref 0–0.1)
BASOPHILS NFR BLD: 0 % (ref 0–2)
BUN SERPL-MCNC: 42 MG/DL (ref 7–18)
BUN/CREAT SERPL: 22 (ref 12–20)
CALCIUM SERPL-MCNC: 8.9 MG/DL (ref 8.5–10.1)
CHLORIDE SERPL-SCNC: 107 MMOL/L (ref 100–111)
CO2 SERPL-SCNC: 28 MMOL/L (ref 21–32)
CREAT SERPL-MCNC: 1.95 MG/DL (ref 0.6–1.3)
DIFFERENTIAL METHOD BLD: ABNORMAL
ECHO BSA: 1.96 M2
EOSINOPHIL # BLD: 0.4 K/UL (ref 0–0.4)
EOSINOPHIL NFR BLD: 3 % (ref 0–5)
ERYTHROCYTE [DISTWIDTH] IN BLOOD BY AUTOMATED COUNT: 16.6 % (ref 11.6–14.5)
GLUCOSE BLD STRIP.AUTO-MCNC: 162 MG/DL (ref 70–110)
GLUCOSE BLD STRIP.AUTO-MCNC: 176 MG/DL (ref 70–110)
GLUCOSE BLD STRIP.AUTO-MCNC: 230 MG/DL (ref 70–110)
GLUCOSE BLD STRIP.AUTO-MCNC: 267 MG/DL (ref 70–110)
GLUCOSE SERPL-MCNC: 121 MG/DL (ref 74–99)
HCT VFR BLD AUTO: 20.5 % (ref 35–45)
HCT VFR BLD AUTO: 25.9 % (ref 35–45)
HGB BLD-MCNC: 6.3 G/DL (ref 12–16)
HGB BLD-MCNC: 8 G/DL (ref 12–16)
HISTORY CHECK: NORMAL
IMM GRANULOCYTES # BLD AUTO: 0.1 K/UL (ref 0–0.04)
IMM GRANULOCYTES NFR BLD AUTO: 0 % (ref 0–0.5)
LYMPHOCYTES # BLD: 1.6 K/UL (ref 0.9–3.6)
LYMPHOCYTES NFR BLD: 12 % (ref 21–52)
MCH RBC QN AUTO: 24.5 PG (ref 24–34)
MCHC RBC AUTO-ENTMCNC: 30.7 G/DL (ref 31–37)
MCV RBC AUTO: 79.8 FL (ref 78–100)
MONOCYTES # BLD: 0.9 K/UL (ref 0.05–1.2)
MONOCYTES NFR BLD: 7 % (ref 3–10)
NEUTS SEG # BLD: 9.7 K/UL (ref 1.8–8)
NEUTS SEG NFR BLD: 77 % (ref 40–73)
NRBC # BLD: 0 K/UL (ref 0–0.01)
NRBC BLD-RTO: 0 PER 100 WBC
PLATELET # BLD AUTO: 313 K/UL (ref 135–420)
PMV BLD AUTO: 11.7 FL (ref 9.2–11.8)
POTASSIUM SERPL-SCNC: 3.2 MMOL/L (ref 3.5–5.5)
RBC # BLD AUTO: 2.57 M/UL (ref 4.2–5.3)
SODIUM SERPL-SCNC: 144 MMOL/L (ref 136–145)
VIT B1 BLD-SCNC: 112.8 NMOL/L (ref 66.5–200)
WBC # BLD AUTO: 12.7 K/UL (ref 4.6–13.2)

## 2024-09-27 PROCEDURE — 2700000000 HC OXYGEN THERAPY PER DAY

## 2024-09-27 PROCEDURE — 36430 TRANSFUSION BLD/BLD COMPNT: CPT

## 2024-09-27 PROCEDURE — 2580000003 HC RX 258: Performed by: INTERNAL MEDICINE

## 2024-09-27 PROCEDURE — 6360000002 HC RX W HCPCS: Performed by: STUDENT IN AN ORGANIZED HEALTH CARE EDUCATION/TRAINING PROGRAM

## 2024-09-27 PROCEDURE — 82962 GLUCOSE BLOOD TEST: CPT

## 2024-09-27 PROCEDURE — 86923 COMPATIBILITY TEST ELECTRIC: CPT

## 2024-09-27 PROCEDURE — 94761 N-INVAS EAR/PLS OXIMETRY MLT: CPT

## 2024-09-27 PROCEDURE — 36415 COLL VENOUS BLD VENIPUNCTURE: CPT

## 2024-09-27 PROCEDURE — 93970 EXTREMITY STUDY: CPT | Performed by: SURGERY

## 2024-09-27 PROCEDURE — 99232 SBSQ HOSP IP/OBS MODERATE 35: CPT | Performed by: INTERNAL MEDICINE

## 2024-09-27 PROCEDURE — 99233 SBSQ HOSP IP/OBS HIGH 50: CPT | Performed by: STUDENT IN AN ORGANIZED HEALTH CARE EDUCATION/TRAINING PROGRAM

## 2024-09-27 PROCEDURE — P9016 RBC LEUKOCYTES REDUCED: HCPCS

## 2024-09-27 PROCEDURE — 85025 COMPLETE CBC W/AUTO DIFF WBC: CPT

## 2024-09-27 PROCEDURE — 86901 BLOOD TYPING SEROLOGIC RH(D): CPT

## 2024-09-27 PROCEDURE — 6370000000 HC RX 637 (ALT 250 FOR IP): Performed by: STUDENT IN AN ORGANIZED HEALTH CARE EDUCATION/TRAINING PROGRAM

## 2024-09-27 PROCEDURE — 6360000002 HC RX W HCPCS: Performed by: INTERNAL MEDICINE

## 2024-09-27 PROCEDURE — 2140000001 HC CVICU INTERMEDIATE R&B

## 2024-09-27 PROCEDURE — 86900 BLOOD TYPING SEROLOGIC ABO: CPT

## 2024-09-27 PROCEDURE — 94660 CPAP INITIATION&MGMT: CPT

## 2024-09-27 PROCEDURE — 6370000000 HC RX 637 (ALT 250 FOR IP): Performed by: INTERNAL MEDICINE

## 2024-09-27 PROCEDURE — 85014 HEMATOCRIT: CPT

## 2024-09-27 PROCEDURE — 80048 BASIC METABOLIC PNL TOTAL CA: CPT

## 2024-09-27 PROCEDURE — 85018 HEMOGLOBIN: CPT

## 2024-09-27 PROCEDURE — 86850 RBC ANTIBODY SCREEN: CPT

## 2024-09-27 PROCEDURE — 93970 EXTREMITY STUDY: CPT

## 2024-09-27 RX ORDER — PANTOPRAZOLE SODIUM 40 MG/1
40 TABLET, DELAYED RELEASE ORAL
Status: DISCONTINUED | OUTPATIENT
Start: 2024-09-28 | End: 2024-10-01 | Stop reason: HOSPADM

## 2024-09-27 RX ORDER — DEXTROSE MONOHYDRATE 100 MG/ML
INJECTION, SOLUTION INTRAVENOUS CONTINUOUS PRN
Status: DISCONTINUED | OUTPATIENT
Start: 2024-09-27 | End: 2024-10-01 | Stop reason: HOSPADM

## 2024-09-27 RX ORDER — LIDOCAINE 4 G/G
1 PATCH TOPICAL DAILY
Status: DISCONTINUED | OUTPATIENT
Start: 2024-09-27 | End: 2024-10-01 | Stop reason: HOSPADM

## 2024-09-27 RX ORDER — FUROSEMIDE 10 MG/ML
40 INJECTION INTRAMUSCULAR; INTRAVENOUS 2 TIMES DAILY
Status: DISCONTINUED | OUTPATIENT
Start: 2024-09-27 | End: 2024-10-01 | Stop reason: HOSPADM

## 2024-09-27 RX ORDER — INSULIN GLARGINE 100 [IU]/ML
5 INJECTION, SOLUTION SUBCUTANEOUS NIGHTLY
Status: DISCONTINUED | OUTPATIENT
Start: 2024-09-28 | End: 2024-09-29

## 2024-09-27 RX ORDER — SODIUM CHLORIDE 9 MG/ML
INJECTION, SOLUTION INTRAVENOUS PRN
Status: DISCONTINUED | OUTPATIENT
Start: 2024-09-27 | End: 2024-10-01 | Stop reason: HOSPADM

## 2024-09-27 RX ADMIN — METOCLOPRAMIDE HYDROCHLORIDE 10 MG: 5 INJECTION INTRAMUSCULAR; INTRAVENOUS at 08:08

## 2024-09-27 RX ADMIN — METOCLOPRAMIDE HYDROCHLORIDE 10 MG: 5 INJECTION INTRAMUSCULAR; INTRAVENOUS at 15:30

## 2024-09-27 RX ADMIN — INSULIN LISPRO 2 UNITS: 100 INJECTION, SOLUTION INTRAVENOUS; SUBCUTANEOUS at 16:00

## 2024-09-27 RX ADMIN — FUROSEMIDE 40 MG: 10 INJECTION, SOLUTION INTRAMUSCULAR; INTRAVENOUS at 02:43

## 2024-09-27 RX ADMIN — TRAMADOL HYDROCHLORIDE 50 MG: 50 TABLET ORAL at 22:43

## 2024-09-27 RX ADMIN — SODIUM CHLORIDE, PRESERVATIVE FREE 10 ML: 5 INJECTION INTRAVENOUS at 08:08

## 2024-09-27 RX ADMIN — ISOSORBIDE DINITRATE 10 MG: 5 TABLET ORAL at 09:26

## 2024-09-27 RX ADMIN — FUROSEMIDE 40 MG: 10 INJECTION, SOLUTION INTRAMUSCULAR; INTRAVENOUS at 08:08

## 2024-09-27 RX ADMIN — ISOSORBIDE DINITRATE 10 MG: 5 TABLET ORAL at 20:59

## 2024-09-27 RX ADMIN — SODIUM CHLORIDE, PRESERVATIVE FREE 10 ML: 5 INJECTION INTRAVENOUS at 21:03

## 2024-09-27 RX ADMIN — INSULIN LISPRO 1 UNITS: 100 INJECTION, SOLUTION INTRAVENOUS; SUBCUTANEOUS at 12:00

## 2024-09-27 RX ADMIN — METOCLOPRAMIDE HYDROCHLORIDE 10 MG: 5 INJECTION INTRAMUSCULAR; INTRAVENOUS at 20:59

## 2024-09-27 RX ADMIN — AMLODIPINE BESYLATE 5 MG: 5 TABLET ORAL at 09:26

## 2024-09-27 RX ADMIN — FUROSEMIDE 40 MG: 10 INJECTION, SOLUTION INTRAMUSCULAR; INTRAVENOUS at 21:18

## 2024-09-27 RX ADMIN — ENOXAPARIN SODIUM 40 MG: 100 INJECTION SUBCUTANEOUS at 08:07

## 2024-09-27 ASSESSMENT — PAIN SCALES - GENERAL
PAINLEVEL_OUTOF10: 0
PAINLEVEL_OUTOF10: 6
PAINLEVEL_OUTOF10: 8
PAINLEVEL_OUTOF10: 3

## 2024-09-27 ASSESSMENT — PAIN DESCRIPTION - FREQUENCY: FREQUENCY: INTERMITTENT

## 2024-09-27 ASSESSMENT — PAIN DESCRIPTION - ORIENTATION: ORIENTATION: MID

## 2024-09-27 ASSESSMENT — PAIN DESCRIPTION - ONSET: ONSET: GRADUAL

## 2024-09-27 ASSESSMENT — PAIN DESCRIPTION - DESCRIPTORS: DESCRIPTORS: ACHING

## 2024-09-27 ASSESSMENT — PAIN DESCRIPTION - LOCATION: LOCATION: BACK

## 2024-09-27 ASSESSMENT — PAIN DESCRIPTION - PAIN TYPE: TYPE: ACUTE PAIN

## 2024-09-27 ASSESSMENT — PAIN - FUNCTIONAL ASSESSMENT: PAIN_FUNCTIONAL_ASSESSMENT: ACTIVITIES ARE NOT PREVENTED

## 2024-09-27 NOTE — CONSENT
Informed Consent for Blood Component Transfusion Note    I have discussed with the patient the rationale for blood component transfusion; its benefits in treating or preventing fatigue, organ damage, or death; and its risk which includes mild transfusion reactions, rare risk of blood borne infection, or more serious but rare reactions. I have discussed the alternatives to transfusion, including the risk and consequences of not receiving transfusion. The patient had an opportunity to ask questions and had agreed to proceed with transfusion of blood components.    Electronically signed by Ramón Hudson MD on 9/27/24 at 10:33 AM EDT

## 2024-09-27 NOTE — CARE COORDINATION
09/27/24 1232   Service Assessment   Patient Orientation Alert and Oriented;Other (see comment)  (Pt would frequently fall asleep during the convseration, wake up, answer, and fall back asleep.)   Cognition Alert   History Provided By Patient   Primary Caregiver Self   Support Systems Family Members;Parent   Patient's Healthcare Decision Maker is: Legal Next of Kin   PCP Verified by CM Yes   Last Visit to PCP Within last 3 months  (Neha Linn MD with [facility])   Prior Functional Level Independent in ADLs/IADLs   Current Functional Level Independent in ADLs/IADLs   Can patient return to prior living arrangement Yes   Ability to make needs known: Good   Family able to assist with home care needs: Yes   Would you like for me to discuss the discharge plan with any other family members/significant others, and if so, who? Yes  (Parent)   Financial Resources Medicaid   Social/Functional History   Lives With Family   Type of Home House   Home Layout One level   Home Access Stairs to enter with rails   Entrance Stairs - Number of Steps 4   Entrance Stairs - Rails Both   Bathroom Shower/Tub Tub/Shower unit   Bathroom Toilet Standard   Bathroom Equipment None   Bathroom Accessibility Accessible   Home Equipment Walker - Rolling   Receives Help From Family   ADL Assistance Independent   Homemaking Assistance Independent   Ambulation Assistance Independent   Transfer Assistance Independent   Active  No   Patient's  Info Family and friends to assist with transport   Discharge Planning   Type of Residence House   Living Arrangements Family Members   Current Services Prior To Admission Durable Medical Equipment   Current DME Prior to Arrival Glucometer;Walker   Potential Assistance Needed N/A   DME Ordered? No   Potential Assistance Purchasing Medications No   Type of Home Care Services None   Patient expects to be discharged to: House   Services At/After Discharge   Transition of Care Consult (CM Consult) N/A

## 2024-09-28 ENCOUNTER — APPOINTMENT (OUTPATIENT)
Facility: HOSPITAL | Age: 39
DRG: 194 | End: 2024-09-28
Payer: MEDICAID

## 2024-09-28 LAB
ABO + RH BLD: NORMAL
ALBUMIN SERPL-MCNC: 2.5 G/DL (ref 3.4–5)
ALBUMIN/GLOB SERPL: 0.5 (ref 0.8–1.7)
ALP SERPL-CCNC: 106 U/L (ref 45–117)
ALT SERPL-CCNC: 49 U/L (ref 13–56)
ANION GAP SERPL CALC-SCNC: 7 MMOL/L (ref 3–18)
AST SERPL-CCNC: 34 U/L (ref 10–38)
BILIRUB SERPL-MCNC: 0.6 MG/DL (ref 0.2–1)
BLD PROD TYP BPU: NORMAL
BLOOD BANK BLOOD PRODUCT EXPIRATION DATE: NORMAL
BLOOD BANK DISPENSE STATUS: NORMAL
BLOOD BANK ISBT PRODUCT BLOOD TYPE: 6200
BLOOD BANK PRODUCT CODE: NORMAL
BLOOD BANK UNIT TYPE AND RH: NORMAL
BLOOD GROUP ANTIBODIES SERPL: NORMAL
BPU ID: NORMAL
BUN SERPL-MCNC: 39 MG/DL (ref 7–18)
BUN/CREAT SERPL: 22 (ref 12–20)
CALCIUM SERPL-MCNC: 9 MG/DL (ref 8.5–10.1)
CALLED TO: NORMAL
CHLORIDE SERPL-SCNC: 101 MMOL/L (ref 100–111)
CO2 SERPL-SCNC: 29 MMOL/L (ref 21–32)
CREAT SERPL-MCNC: 1.75 MG/DL (ref 0.6–1.3)
CROSSMATCH RESULT: NORMAL
ERYTHROCYTE [DISTWIDTH] IN BLOOD BY AUTOMATED COUNT: 16.4 % (ref 11.6–14.5)
FERRITIN SERPL-MCNC: 81 NG/ML (ref 8–388)
GLOBULIN SER CALC-MCNC: 4.6 G/DL (ref 2–4)
GLUCOSE BLD STRIP.AUTO-MCNC: 165 MG/DL (ref 70–110)
GLUCOSE BLD STRIP.AUTO-MCNC: 184 MG/DL (ref 70–110)
GLUCOSE BLD STRIP.AUTO-MCNC: 247 MG/DL (ref 70–110)
GLUCOSE BLD STRIP.AUTO-MCNC: 311 MG/DL (ref 70–110)
GLUCOSE SERPL-MCNC: 304 MG/DL (ref 74–99)
HCT VFR BLD AUTO: 24.6 % (ref 35–45)
HGB BLD-MCNC: 7.8 G/DL (ref 12–16)
IRON SATN MFR SERPL: 7 % (ref 20–50)
IRON SERPL-MCNC: 23 UG/DL (ref 50–175)
MAGNESIUM SERPL-MCNC: 1.8 MG/DL (ref 1.6–2.6)
MCH RBC QN AUTO: 25.2 PG (ref 24–34)
MCHC RBC AUTO-ENTMCNC: 31.7 G/DL (ref 31–37)
MCV RBC AUTO: 79.6 FL (ref 78–100)
NRBC # BLD: 0 K/UL (ref 0–0.01)
NRBC BLD-RTO: 0 PER 100 WBC
PLATELET # BLD AUTO: 271 K/UL (ref 135–420)
PMV BLD AUTO: 11.5 FL (ref 9.2–11.8)
POTASSIUM SERPL-SCNC: 3.4 MMOL/L (ref 3.5–5.5)
PROT SERPL-MCNC: 7.1 G/DL (ref 6.4–8.2)
RBC # BLD AUTO: 3.09 M/UL (ref 4.2–5.3)
RETICS/RBC NFR AUTO: 1.5 % (ref 0.5–2.5)
SODIUM SERPL-SCNC: 137 MMOL/L (ref 136–145)
SPECIMEN EXP DATE BLD: NORMAL
TIBC SERPL-MCNC: 337 UG/DL (ref 250–450)
UNIT DIVISION: 0
UNIT ISSUE DATE/TIME: NORMAL
WBC # BLD AUTO: 9.5 K/UL (ref 4.6–13.2)

## 2024-09-28 PROCEDURE — 94660 CPAP INITIATION&MGMT: CPT

## 2024-09-28 PROCEDURE — 71045 X-RAY EXAM CHEST 1 VIEW: CPT

## 2024-09-28 PROCEDURE — 85027 COMPLETE CBC AUTOMATED: CPT

## 2024-09-28 PROCEDURE — 6360000002 HC RX W HCPCS: Performed by: STUDENT IN AN ORGANIZED HEALTH CARE EDUCATION/TRAINING PROGRAM

## 2024-09-28 PROCEDURE — 99233 SBSQ HOSP IP/OBS HIGH 50: CPT | Performed by: STUDENT IN AN ORGANIZED HEALTH CARE EDUCATION/TRAINING PROGRAM

## 2024-09-28 PROCEDURE — 83550 IRON BINDING TEST: CPT

## 2024-09-28 PROCEDURE — 36415 COLL VENOUS BLD VENIPUNCTURE: CPT

## 2024-09-28 PROCEDURE — 83540 ASSAY OF IRON: CPT

## 2024-09-28 PROCEDURE — 85045 AUTOMATED RETICULOCYTE COUNT: CPT

## 2024-09-28 PROCEDURE — 83735 ASSAY OF MAGNESIUM: CPT

## 2024-09-28 PROCEDURE — 2700000000 HC OXYGEN THERAPY PER DAY

## 2024-09-28 PROCEDURE — 99232 SBSQ HOSP IP/OBS MODERATE 35: CPT | Performed by: INTERNAL MEDICINE

## 2024-09-28 PROCEDURE — 6370000000 HC RX 637 (ALT 250 FOR IP): Performed by: STUDENT IN AN ORGANIZED HEALTH CARE EDUCATION/TRAINING PROGRAM

## 2024-09-28 PROCEDURE — 80053 COMPREHEN METABOLIC PANEL: CPT

## 2024-09-28 PROCEDURE — 2140000001 HC CVICU INTERMEDIATE R&B

## 2024-09-28 PROCEDURE — 6370000000 HC RX 637 (ALT 250 FOR IP): Performed by: INTERNAL MEDICINE

## 2024-09-28 PROCEDURE — 82962 GLUCOSE BLOOD TEST: CPT

## 2024-09-28 PROCEDURE — 6370000000 HC RX 637 (ALT 250 FOR IP): Performed by: HOSPITALIST

## 2024-09-28 PROCEDURE — 82728 ASSAY OF FERRITIN: CPT

## 2024-09-28 PROCEDURE — 94761 N-INVAS EAR/PLS OXIMETRY MLT: CPT

## 2024-09-28 PROCEDURE — 2580000003 HC RX 258: Performed by: INTERNAL MEDICINE

## 2024-09-28 RX ORDER — TRAZODONE HYDROCHLORIDE 50 MG/1
50 TABLET, FILM COATED ORAL NIGHTLY
Status: DISCONTINUED | OUTPATIENT
Start: 2024-09-28 | End: 2024-10-01 | Stop reason: HOSPADM

## 2024-09-28 RX ADMIN — TRAMADOL HYDROCHLORIDE 50 MG: 50 TABLET ORAL at 16:00

## 2024-09-28 RX ADMIN — FOLIC ACID 1 MG: 1 TABLET ORAL at 08:38

## 2024-09-28 RX ADMIN — METOCLOPRAMIDE HYDROCHLORIDE 10 MG: 5 INJECTION INTRAMUSCULAR; INTRAVENOUS at 08:38

## 2024-09-28 RX ADMIN — FUROSEMIDE 40 MG: 10 INJECTION, SOLUTION INTRAMUSCULAR; INTRAVENOUS at 08:38

## 2024-09-28 RX ADMIN — INSULIN LISPRO 3 UNITS: 100 INJECTION, SOLUTION INTRAVENOUS; SUBCUTANEOUS at 11:59

## 2024-09-28 RX ADMIN — METOCLOPRAMIDE HYDROCHLORIDE 10 MG: 5 INJECTION INTRAMUSCULAR; INTRAVENOUS at 16:00

## 2024-09-28 RX ADMIN — METOCLOPRAMIDE HYDROCHLORIDE 10 MG: 5 INJECTION INTRAMUSCULAR; INTRAVENOUS at 20:44

## 2024-09-28 RX ADMIN — INSULIN GLARGINE 5 UNITS: 100 INJECTION, SOLUTION SUBCUTANEOUS at 20:44

## 2024-09-28 RX ADMIN — TRAZODONE HYDROCHLORIDE 50 MG: 50 TABLET ORAL at 20:44

## 2024-09-28 RX ADMIN — Medication 100 MG: at 08:38

## 2024-09-28 RX ADMIN — ISOSORBIDE DINITRATE 10 MG: 5 TABLET ORAL at 20:44

## 2024-09-28 RX ADMIN — SODIUM CHLORIDE, PRESERVATIVE FREE 10 ML: 5 INJECTION INTRAVENOUS at 20:45

## 2024-09-28 RX ADMIN — SODIUM CHLORIDE, PRESERVATIVE FREE 10 ML: 5 INJECTION INTRAVENOUS at 08:38

## 2024-09-28 RX ADMIN — FUROSEMIDE 40 MG: 10 INJECTION, SOLUTION INTRAMUSCULAR; INTRAVENOUS at 16:01

## 2024-09-28 RX ADMIN — ISOSORBIDE DINITRATE 10 MG: 5 TABLET ORAL at 08:38

## 2024-09-28 RX ADMIN — TRAZODONE HYDROCHLORIDE 50 MG: 50 TABLET ORAL at 04:25

## 2024-09-28 RX ADMIN — AMLODIPINE BESYLATE 5 MG: 5 TABLET ORAL at 08:38

## 2024-09-28 RX ADMIN — ISOSORBIDE DINITRATE 10 MG: 5 TABLET ORAL at 16:00

## 2024-09-28 RX ADMIN — PANTOPRAZOLE SODIUM 40 MG: 40 TABLET, DELAYED RELEASE ORAL at 06:29

## 2024-09-28 ASSESSMENT — PAIN DESCRIPTION - FREQUENCY: FREQUENCY: INTERMITTENT

## 2024-09-28 ASSESSMENT — PAIN SCALES - GENERAL
PAINLEVEL_OUTOF10: 8
PAINLEVEL_OUTOF10: 0
PAINLEVEL_OUTOF10: 0
PAINLEVEL_OUTOF10: 3
PAINLEVEL_OUTOF10: 0
PAINLEVEL_OUTOF10: 3

## 2024-09-28 ASSESSMENT — PAIN DESCRIPTION - ORIENTATION: ORIENTATION: MID

## 2024-09-28 ASSESSMENT — PAIN DESCRIPTION - PAIN TYPE: TYPE: ACUTE PAIN

## 2024-09-28 ASSESSMENT — PAIN DESCRIPTION - ONSET: ONSET: GRADUAL

## 2024-09-28 ASSESSMENT — PAIN DESCRIPTION - DESCRIPTORS: DESCRIPTORS: CRAMPING

## 2024-09-28 ASSESSMENT — PAIN DESCRIPTION - LOCATION: LOCATION: ABDOMEN

## 2024-09-28 ASSESSMENT — PAIN - FUNCTIONAL ASSESSMENT: PAIN_FUNCTIONAL_ASSESSMENT: ACTIVITIES ARE NOT PREVENTED

## 2024-09-29 PROBLEM — R06.02 SHORTNESS OF BREATH: Status: ACTIVE | Noted: 2024-09-29

## 2024-09-29 LAB
ANION GAP SERPL CALC-SCNC: 7 MMOL/L (ref 3–18)
BASOPHILS # BLD: 0 K/UL (ref 0–0.1)
BASOPHILS NFR BLD: 0 % (ref 0–2)
BUN SERPL-MCNC: 41 MG/DL (ref 7–18)
BUN/CREAT SERPL: 24 (ref 12–20)
CALCIUM SERPL-MCNC: 9.4 MG/DL (ref 8.5–10.1)
CHLORIDE SERPL-SCNC: 100 MMOL/L (ref 100–111)
CO2 SERPL-SCNC: 29 MMOL/L (ref 21–32)
CREAT SERPL-MCNC: 1.68 MG/DL (ref 0.6–1.3)
DIFFERENTIAL METHOD BLD: ABNORMAL
EOSINOPHIL # BLD: 1.2 K/UL (ref 0–0.4)
EOSINOPHIL NFR BLD: 15 % (ref 0–5)
ERYTHROCYTE [DISTWIDTH] IN BLOOD BY AUTOMATED COUNT: 16.2 % (ref 11.6–14.5)
GLUCOSE BLD STRIP.AUTO-MCNC: 206 MG/DL (ref 70–110)
GLUCOSE BLD STRIP.AUTO-MCNC: 219 MG/DL (ref 70–110)
GLUCOSE BLD STRIP.AUTO-MCNC: 244 MG/DL (ref 70–110)
GLUCOSE BLD STRIP.AUTO-MCNC: 274 MG/DL (ref 70–110)
GLUCOSE SERPL-MCNC: 186 MG/DL (ref 74–99)
HCT VFR BLD AUTO: 27.8 % (ref 35–45)
HGB BLD-MCNC: 8.5 G/DL (ref 12–16)
IMM GRANULOCYTES # BLD AUTO: 0 K/UL (ref 0–0.04)
IMM GRANULOCYTES NFR BLD AUTO: 0 % (ref 0–0.5)
LYMPHOCYTES # BLD: 1.7 K/UL (ref 0.9–3.6)
LYMPHOCYTES NFR BLD: 21 % (ref 21–52)
MAGNESIUM SERPL-MCNC: 1.7 MG/DL (ref 1.6–2.6)
MCH RBC QN AUTO: 24.5 PG (ref 24–34)
MCHC RBC AUTO-ENTMCNC: 30.6 G/DL (ref 31–37)
MCV RBC AUTO: 80.1 FL (ref 78–100)
MONOCYTES # BLD: 0.6 K/UL (ref 0.05–1.2)
MONOCYTES NFR BLD: 7 % (ref 3–10)
NEUTS SEG # BLD: 4.4 K/UL (ref 1.8–8)
NEUTS SEG NFR BLD: 57 % (ref 40–73)
NRBC # BLD: 0 K/UL (ref 0–0.01)
NRBC BLD-RTO: 0 PER 100 WBC
PLATELET # BLD AUTO: 317 K/UL (ref 135–420)
PLATELET COMMENT: ABNORMAL
PMV BLD AUTO: 11.3 FL (ref 9.2–11.8)
POTASSIUM SERPL-SCNC: 3.3 MMOL/L (ref 3.5–5.5)
RBC # BLD AUTO: 3.47 M/UL (ref 4.2–5.3)
RBC MORPH BLD: ABNORMAL
SODIUM SERPL-SCNC: 136 MMOL/L (ref 136–145)
WBC # BLD AUTO: 7.9 K/UL (ref 4.6–13.2)

## 2024-09-29 PROCEDURE — 6370000000 HC RX 637 (ALT 250 FOR IP): Performed by: STUDENT IN AN ORGANIZED HEALTH CARE EDUCATION/TRAINING PROGRAM

## 2024-09-29 PROCEDURE — 6370000000 HC RX 637 (ALT 250 FOR IP): Performed by: INTERNAL MEDICINE

## 2024-09-29 PROCEDURE — 82962 GLUCOSE BLOOD TEST: CPT

## 2024-09-29 PROCEDURE — 2580000003 HC RX 258: Performed by: INTERNAL MEDICINE

## 2024-09-29 PROCEDURE — 2700000000 HC OXYGEN THERAPY PER DAY

## 2024-09-29 PROCEDURE — 99232 SBSQ HOSP IP/OBS MODERATE 35: CPT | Performed by: STUDENT IN AN ORGANIZED HEALTH CARE EDUCATION/TRAINING PROGRAM

## 2024-09-29 PROCEDURE — 99232 SBSQ HOSP IP/OBS MODERATE 35: CPT | Performed by: INTERNAL MEDICINE

## 2024-09-29 PROCEDURE — 6360000002 HC RX W HCPCS: Performed by: STUDENT IN AN ORGANIZED HEALTH CARE EDUCATION/TRAINING PROGRAM

## 2024-09-29 PROCEDURE — 85025 COMPLETE CBC W/AUTO DIFF WBC: CPT

## 2024-09-29 PROCEDURE — 94660 CPAP INITIATION&MGMT: CPT

## 2024-09-29 PROCEDURE — 80048 BASIC METABOLIC PNL TOTAL CA: CPT

## 2024-09-29 PROCEDURE — 94761 N-INVAS EAR/PLS OXIMETRY MLT: CPT

## 2024-09-29 PROCEDURE — 36415 COLL VENOUS BLD VENIPUNCTURE: CPT

## 2024-09-29 PROCEDURE — 83735 ASSAY OF MAGNESIUM: CPT

## 2024-09-29 PROCEDURE — 2140000001 HC CVICU INTERMEDIATE R&B

## 2024-09-29 RX ORDER — AMLODIPINE BESYLATE 5 MG/1
5 TABLET ORAL ONCE
Status: COMPLETED | OUTPATIENT
Start: 2024-09-29 | End: 2024-09-29

## 2024-09-29 RX ORDER — INSULIN LISPRO 100 [IU]/ML
3 INJECTION, SOLUTION INTRAVENOUS; SUBCUTANEOUS
Status: DISCONTINUED | OUTPATIENT
Start: 2024-09-29 | End: 2024-10-01 | Stop reason: HOSPADM

## 2024-09-29 RX ORDER — AMLODIPINE BESYLATE 10 MG/1
10 TABLET ORAL DAILY
Status: DISCONTINUED | OUTPATIENT
Start: 2024-09-30 | End: 2024-10-01 | Stop reason: HOSPADM

## 2024-09-29 RX ORDER — METOCLOPRAMIDE HYDROCHLORIDE 5 MG/ML
5 INJECTION INTRAMUSCULAR; INTRAVENOUS 3 TIMES DAILY
Status: DISCONTINUED | OUTPATIENT
Start: 2024-09-29 | End: 2024-10-01 | Stop reason: HOSPADM

## 2024-09-29 RX ORDER — INSULIN GLARGINE 100 [IU]/ML
10 INJECTION, SOLUTION SUBCUTANEOUS NIGHTLY
Status: DISCONTINUED | OUTPATIENT
Start: 2024-09-29 | End: 2024-10-01 | Stop reason: HOSPADM

## 2024-09-29 RX ADMIN — TRAMADOL HYDROCHLORIDE 50 MG: 50 TABLET ORAL at 07:12

## 2024-09-29 RX ADMIN — SODIUM CHLORIDE, PRESERVATIVE FREE 10 ML: 5 INJECTION INTRAVENOUS at 08:32

## 2024-09-29 RX ADMIN — Medication 100 MG: at 08:32

## 2024-09-29 RX ADMIN — INSULIN LISPRO 3 UNITS: 100 INJECTION, SOLUTION INTRAVENOUS; SUBCUTANEOUS at 11:46

## 2024-09-29 RX ADMIN — AMLODIPINE BESYLATE 5 MG: 5 TABLET ORAL at 11:46

## 2024-09-29 RX ADMIN — INSULIN LISPRO 1 UNITS: 100 INJECTION, SOLUTION INTRAVENOUS; SUBCUTANEOUS at 08:31

## 2024-09-29 RX ADMIN — INSULIN LISPRO 1 UNITS: 100 INJECTION, SOLUTION INTRAVENOUS; SUBCUTANEOUS at 11:46

## 2024-09-29 RX ADMIN — ISOSORBIDE DINITRATE 10 MG: 5 TABLET ORAL at 16:00

## 2024-09-29 RX ADMIN — TRAZODONE HYDROCHLORIDE 50 MG: 50 TABLET ORAL at 20:36

## 2024-09-29 RX ADMIN — AMLODIPINE BESYLATE 5 MG: 5 TABLET ORAL at 08:32

## 2024-09-29 RX ADMIN — PANTOPRAZOLE SODIUM 40 MG: 40 TABLET, DELAYED RELEASE ORAL at 05:19

## 2024-09-29 RX ADMIN — ISOSORBIDE DINITRATE 10 MG: 5 TABLET ORAL at 08:32

## 2024-09-29 RX ADMIN — TRAMADOL HYDROCHLORIDE 50 MG: 50 TABLET ORAL at 16:50

## 2024-09-29 RX ADMIN — INSULIN LISPRO 2 UNITS: 100 INJECTION, SOLUTION INTRAVENOUS; SUBCUTANEOUS at 16:42

## 2024-09-29 RX ADMIN — METOCLOPRAMIDE 5 MG: 5 INJECTION, SOLUTION INTRAMUSCULAR; INTRAVENOUS at 16:34

## 2024-09-29 RX ADMIN — INSULIN GLARGINE 10 UNITS: 100 INJECTION, SOLUTION SUBCUTANEOUS at 20:36

## 2024-09-29 RX ADMIN — METOCLOPRAMIDE HYDROCHLORIDE 10 MG: 5 INJECTION INTRAMUSCULAR; INTRAVENOUS at 08:32

## 2024-09-29 RX ADMIN — FOLIC ACID 1 MG: 1 TABLET ORAL at 08:32

## 2024-09-29 RX ADMIN — Medication 1 SPRAY: at 09:54

## 2024-09-29 RX ADMIN — ISOSORBIDE DINITRATE 10 MG: 5 TABLET ORAL at 20:36

## 2024-09-29 RX ADMIN — METOCLOPRAMIDE 5 MG: 5 INJECTION, SOLUTION INTRAMUSCULAR; INTRAVENOUS at 20:36

## 2024-09-29 RX ADMIN — SODIUM CHLORIDE, PRESERVATIVE FREE 10 ML: 5 INJECTION INTRAVENOUS at 22:24

## 2024-09-29 RX ADMIN — FUROSEMIDE 40 MG: 10 INJECTION, SOLUTION INTRAMUSCULAR; INTRAVENOUS at 08:32

## 2024-09-29 RX ADMIN — FUROSEMIDE 40 MG: 10 INJECTION, SOLUTION INTRAMUSCULAR; INTRAVENOUS at 16:35

## 2024-09-29 RX ADMIN — INSULIN LISPRO 3 UNITS: 100 INJECTION, SOLUTION INTRAVENOUS; SUBCUTANEOUS at 16:41

## 2024-09-29 ASSESSMENT — PAIN DESCRIPTION - ONSET
ONSET: ON-GOING
ONSET: GRADUAL

## 2024-09-29 ASSESSMENT — PAIN - FUNCTIONAL ASSESSMENT
PAIN_FUNCTIONAL_ASSESSMENT: ACTIVITIES ARE NOT PREVENTED
PAIN_FUNCTIONAL_ASSESSMENT: PREVENTS OR INTERFERES SOME ACTIVE ACTIVITIES AND ADLS
PAIN_FUNCTIONAL_ASSESSMENT: ACTIVITIES ARE NOT PREVENTED

## 2024-09-29 ASSESSMENT — PAIN SCALES - GENERAL
PAINLEVEL_OUTOF10: 7
PAINLEVEL_OUTOF10: 3
PAINLEVEL_OUTOF10: 8
PAINLEVEL_OUTOF10: 3
PAINLEVEL_OUTOF10: 0
PAINLEVEL_OUTOF10: 7
PAINLEVEL_OUTOF10: 3
PAINLEVEL_OUTOF10: 3

## 2024-09-29 ASSESSMENT — PAIN DESCRIPTION - FREQUENCY
FREQUENCY: CONTINUOUS
FREQUENCY: INTERMITTENT

## 2024-09-29 ASSESSMENT — PAIN DESCRIPTION - DESCRIPTORS
DESCRIPTORS: ACHING
DESCRIPTORS: ACHING
DESCRIPTORS: ACHING;DISCOMFORT

## 2024-09-29 ASSESSMENT — PAIN DESCRIPTION - ORIENTATION
ORIENTATION: OTHER (COMMENT)
ORIENTATION: MID

## 2024-09-29 ASSESSMENT — PAIN DESCRIPTION - LOCATION
LOCATION: ABDOMEN;BACK
LOCATION: GENERALIZED
LOCATION: ABDOMEN;BACK

## 2024-09-29 ASSESSMENT — PAIN DESCRIPTION - PAIN TYPE
TYPE: ACUTE PAIN
TYPE: ACUTE PAIN

## 2024-09-29 NOTE — RT PROTOCOL NOTE
RT to bedside to titrate patient from hfnc. Upon arrival patient sitting in bed with sats 100 on 25L, 40% hfnc. Patient denying any trouble breathing and reports she is anxious to be able to walk around more. Patient taken off hfnc when she reported to she needed to go RR and went on room air. Upon return patient sats still remaining in the mid to upper 90s and patient still denying sob so she was left on room air at this time. However after a few more minutes patient was noted to drop to 86-88 with good pleth so 3 liters applied and sats increased to 98- 100

## 2024-09-29 NOTE — CONSULTS
[facility] - Consult Note    Cardiology consultation request from Dr. Hudson for evaluation and management/treatment of CHF    Date of  Admission: 9/24/2024  6:58 PM   Primary Care Physician:  Unknown, Provider    Attending Cardiologist: Dr. Len Hughes      Assessment:     -Acute hypoxic respiratory failure, requiring bipap.   -Acute on chronic HFpEF exacerbation   Echo 05/2024 and 2018, normal EF.   -Anemia, Hgb 7.0   -Normal [facility] 05/2024, [facility]   -CKD   -DM2   -Hx GIB, s/p EGD 05/2024 esophagitis with abnormal appearing mucosa in the 2nd portion of the duodenum   -Alcohol abuse, presented from rehab  -Active tobacco abuse       Primary cardiologist is at [facility]      Plan:       I saw, evaluated, interviewed and examined the patient personally.  Currently on BiPAP.  Denies any chest pain.  Mild dyspnea.  No HFpEF.  Normal EF in 05/2020.  Cardiac cath at [facility] [place] 05/30/2024 without any obstructive CAD  On exam decreased breath sounds with bibasilar well.  Tachycardia.  Unable to obtain JVD.  Currently on BiPAP.  Abdominal tenderness not present  Labs reviewed with elevated BNP at 23K  EKG reviewed, sinus tachycardia    Recommendation  - Acute on chronic HFpEF.  Elevated BNP, chest x-ray with congestion.  Agree with IV Lasix  5/2024 normal EF.  Continue antihypertensive meds  Will follow      Significant time spent in reviewing the case, multiple EMR databases, physician notes, reviewing pertinent labs and imaging studies  I spent significant amount of time for medical decision making and updated history, and other providers assessments as well.  I personally agree with the findings as stated and the plan as documented.  I saw, examined, and evaluated this patient and performed the substantive portion of the encounter for > 50% of the time  Extensive history, physical exam, assessment and plan    Len Hughes MD       -IV lasix 40 mg BID, strict I/Os. Follow renal indices and electrolytes.   -Add 
recent):  US MESENTERIC ARTERY 01/19/2024    Narrative  General: The mesenteric arterial circulation was evaluated with pulse wave Doppler and color flow duplex imaging.  The paravisceral abdominal aorta, celiac and superior mesenteric arteries demonstrate a normal Doppler profile and normal flow velocities. The superior mesenteric to aortic artery ratio was less than 3.0.  Due to bowel gas, the inferior mesenteric artery could not be positively identified. The superior mesenteric vein was patent with a normal venous flow pattern.  Conclusions: The celiac and superior mesenteric arteries were patent, with normal flow velocities and Doppler profile.  The superior mesenteric vein is widely patent.  The inferior mesenteric artery was not visualized as described.     09/24/24    ECHO (TTE) LIMITED (PRN CONTRAST/BUBBLE/STRAIN/3D) 09/26/2024  1:03 PM (Final)    Interpretation Summary    Patient on BiPAP and unable to get comfortable throughout exam, unable to continue due to patient agitation.. No contrast was given.    Aortic Valve: No stenosis.    Tricuspid Valve: Not well visualized. No regurgitation. No stenosis noted. Unable to assess RVSP due to inadequate or insignificant tricuspid regurgitation.    Pericardium: Trivial pericardial effusion present. No indication of cardiac tamponade.    Image quality is technically difficult. Technically difficult study, limited study due to patient's ability to tolerate test and procedure performed with the patient in a sitting position. Patient on BiPAP and unable to get comfortable throughout exam, unable to continue due to patient agitation.. No contrast was given.    Left Ventricle: Normal left ventricular systolic function with a visually estimated EF of 55 - 60%. Left ventricle size is normal. Mildly increased wall thickness. No obvious regional wall motion abnormalities.    Right Ventricle: Right ventricle size is normal. RV basal diameter is 3.6 cm. Normal systolic 
imaging  follow-up. Cardiac silhouette and osseous structures are unchanged.    Impression  Perhaps similar to marginally improvement as above.    Electronically signed by Daniela Hughes    CT Result (most recent):  CTA CHEST ABDOMEN PELVIS W CONTRAST 04/15/2024    Narrative  EXAM: CTA CHEST PE/CT ABD/PELV W/CON    CLINICAL INDICATION/HISTORY: Pulmonary embolism (PE) suspected, positive D-dimer    COMPARISON: 2023    TECHNIQUE: CTA of the chest was performed using timing optimized for pulmonary embolism technique, with IV contrast.  To maximize sensitivity the sagittal and coronal reconstructions were created using a 3D multislice MIP (maximal intensity projection) methodology.  This was followed by enhanced CT abdomen and pelvis imaging, with standard coronal and sagittal reconstructions.    CT scans at this facility are performed using dose optimization technique as appropriate to the performed exam, to include automated exposure control, adjustment of the mA and/or kV according to patient size (including appropriate matching for site specific examinations), or use of iterative reconstruction technique.    FINDINGS:  Pulmonary arteries: No filling defects are appreciated within the main, lobar or visualized segmental pulmonary arteries to suggest embolism.    Aorta and other cardiovascular structures: No aortic aneurysm or dissection. No cardiac strain    Lung/Airway:  Negative.    Pleura:  No pleural effusion.    Lymph nodes:  No lymphadenopathy.    Chest wall and lower neck: Negative.    Esophagus: Patulous esophagus with stable mild wall thickening distally, likely due to reflux esophagitis    CT ABDOMEN AND PELVIS FINDINGS:  Liver: Negative.    Biliary: Negative.    Pancreas: Negative.    Spleen: Negative.    Adrenal glands: Negative.    Kidneys: Stable mild asymmetric left renal atrophy. Symmetric renal enhancement.    Bladder and Pelvic Organs: The bladder is distended and unremarkable. Small physiologic size

## 2024-09-30 LAB
ANION GAP SERPL CALC-SCNC: 7 MMOL/L (ref 3–18)
ANION GAP SERPL CALC-SCNC: 8 MMOL/L (ref 3–18)
BACTERIA SPEC CULT: NORMAL
BACTERIA SPEC CULT: NORMAL
BUN SERPL-MCNC: 48 MG/DL (ref 7–18)
BUN SERPL-MCNC: 49 MG/DL (ref 7–18)
BUN/CREAT SERPL: 21 (ref 12–20)
BUN/CREAT SERPL: 22 (ref 12–20)
CALCIUM SERPL-MCNC: 9 MG/DL (ref 8.5–10.1)
CALCIUM SERPL-MCNC: 9.2 MG/DL (ref 8.5–10.1)
CHLORIDE SERPL-SCNC: 101 MMOL/L (ref 100–111)
CHLORIDE SERPL-SCNC: 98 MMOL/L (ref 100–111)
CO2 SERPL-SCNC: 27 MMOL/L (ref 21–32)
CO2 SERPL-SCNC: 28 MMOL/L (ref 21–32)
CREAT SERPL-MCNC: 2.18 MG/DL (ref 0.6–1.3)
CREAT SERPL-MCNC: 2.31 MG/DL (ref 0.6–1.3)
ERYTHROCYTE [DISTWIDTH] IN BLOOD BY AUTOMATED COUNT: 16.3 % (ref 11.6–14.5)
GLUCOSE BLD STRIP.AUTO-MCNC: 134 MG/DL (ref 70–110)
GLUCOSE BLD STRIP.AUTO-MCNC: 162 MG/DL (ref 70–110)
GLUCOSE BLD STRIP.AUTO-MCNC: 196 MG/DL (ref 70–110)
GLUCOSE BLD STRIP.AUTO-MCNC: 212 MG/DL (ref 70–110)
GLUCOSE SERPL-MCNC: 117 MG/DL (ref 74–99)
GLUCOSE SERPL-MCNC: 198 MG/DL (ref 74–99)
HCT VFR BLD AUTO: 24.7 % (ref 35–45)
HGB BLD-MCNC: 7.7 G/DL (ref 12–16)
MCH RBC QN AUTO: 24.8 PG (ref 24–34)
MCHC RBC AUTO-ENTMCNC: 31.2 G/DL (ref 31–37)
MCV RBC AUTO: 79.7 FL (ref 78–100)
NRBC # BLD: 0 K/UL (ref 0–0.01)
NRBC BLD-RTO: 0 PER 100 WBC
PLATELET # BLD AUTO: 343 K/UL (ref 135–420)
PMV BLD AUTO: 11.1 FL (ref 9.2–11.8)
POTASSIUM SERPL-SCNC: 3.1 MMOL/L (ref 3.5–5.5)
POTASSIUM SERPL-SCNC: 3.6 MMOL/L (ref 3.5–5.5)
RBC # BLD AUTO: 3.1 M/UL (ref 4.2–5.3)
SERVICE CMNT-IMP: NORMAL
SERVICE CMNT-IMP: NORMAL
SODIUM SERPL-SCNC: 133 MMOL/L (ref 136–145)
SODIUM SERPL-SCNC: 136 MMOL/L (ref 136–145)
WBC # BLD AUTO: 8.6 K/UL (ref 4.6–13.2)

## 2024-09-30 PROCEDURE — 6370000000 HC RX 637 (ALT 250 FOR IP): Performed by: INTERNAL MEDICINE

## 2024-09-30 PROCEDURE — 6360000002 HC RX W HCPCS: Performed by: STUDENT IN AN ORGANIZED HEALTH CARE EDUCATION/TRAINING PROGRAM

## 2024-09-30 PROCEDURE — 36415 COLL VENOUS BLD VENIPUNCTURE: CPT

## 2024-09-30 PROCEDURE — 80048 BASIC METABOLIC PNL TOTAL CA: CPT

## 2024-09-30 PROCEDURE — 82962 GLUCOSE BLOOD TEST: CPT

## 2024-09-30 PROCEDURE — 85027 COMPLETE CBC AUTOMATED: CPT

## 2024-09-30 PROCEDURE — 2580000003 HC RX 258: Performed by: INTERNAL MEDICINE

## 2024-09-30 PROCEDURE — 6370000000 HC RX 637 (ALT 250 FOR IP): Performed by: STUDENT IN AN ORGANIZED HEALTH CARE EDUCATION/TRAINING PROGRAM

## 2024-09-30 PROCEDURE — 99232 SBSQ HOSP IP/OBS MODERATE 35: CPT | Performed by: STUDENT IN AN ORGANIZED HEALTH CARE EDUCATION/TRAINING PROGRAM

## 2024-09-30 PROCEDURE — 2140000001 HC CVICU INTERMEDIATE R&B

## 2024-09-30 PROCEDURE — 99232 SBSQ HOSP IP/OBS MODERATE 35: CPT | Performed by: INTERNAL MEDICINE

## 2024-09-30 RX ORDER — IPRATROPIUM BROMIDE AND ALBUTEROL SULFATE 2.5; .5 MG/3ML; MG/3ML
1 SOLUTION RESPIRATORY (INHALATION) EVERY 4 HOURS PRN
Status: DISCONTINUED | OUTPATIENT
Start: 2024-09-30 | End: 2024-10-01 | Stop reason: HOSPADM

## 2024-09-30 RX ORDER — NALOXONE HYDROCHLORIDE 0.4 MG/ML
0.4 INJECTION, SOLUTION INTRAMUSCULAR; INTRAVENOUS; SUBCUTANEOUS PRN
Status: DISCONTINUED | OUTPATIENT
Start: 2024-09-30 | End: 2024-10-01 | Stop reason: HOSPADM

## 2024-09-30 RX ORDER — MECOBALAMIN 5000 MCG
5 TABLET,DISINTEGRATING ORAL NIGHTLY PRN
Status: DISCONTINUED | OUTPATIENT
Start: 2024-09-30 | End: 2024-10-01 | Stop reason: HOSPADM

## 2024-09-30 RX ADMIN — TRAMADOL HYDROCHLORIDE 50 MG: 50 TABLET ORAL at 09:48

## 2024-09-30 RX ADMIN — ENOXAPARIN SODIUM 40 MG: 100 INJECTION SUBCUTANEOUS at 09:40

## 2024-09-30 RX ADMIN — SODIUM CHLORIDE, PRESERVATIVE FREE 10 ML: 5 INJECTION INTRAVENOUS at 09:42

## 2024-09-30 RX ADMIN — Medication 100 MG: at 09:40

## 2024-09-30 RX ADMIN — INSULIN GLARGINE 10 UNITS: 100 INJECTION, SOLUTION SUBCUTANEOUS at 20:26

## 2024-09-30 RX ADMIN — INSULIN LISPRO 3 UNITS: 100 INJECTION, SOLUTION INTRAVENOUS; SUBCUTANEOUS at 09:48

## 2024-09-30 RX ADMIN — METOCLOPRAMIDE 5 MG: 5 INJECTION, SOLUTION INTRAMUSCULAR; INTRAVENOUS at 20:26

## 2024-09-30 RX ADMIN — INSULIN LISPRO 3 UNITS: 100 INJECTION, SOLUTION INTRAVENOUS; SUBCUTANEOUS at 12:30

## 2024-09-30 RX ADMIN — METOCLOPRAMIDE 5 MG: 5 INJECTION, SOLUTION INTRAMUSCULAR; INTRAVENOUS at 16:43

## 2024-09-30 RX ADMIN — INSULIN LISPRO 1 UNITS: 100 INJECTION, SOLUTION INTRAVENOUS; SUBCUTANEOUS at 12:30

## 2024-09-30 RX ADMIN — FOLIC ACID 1 MG: 1 TABLET ORAL at 09:40

## 2024-09-30 RX ADMIN — PANTOPRAZOLE SODIUM 40 MG: 40 TABLET, DELAYED RELEASE ORAL at 06:56

## 2024-09-30 RX ADMIN — TRAZODONE HYDROCHLORIDE 50 MG: 50 TABLET ORAL at 20:26

## 2024-09-30 RX ADMIN — INSULIN LISPRO 3 UNITS: 100 INJECTION, SOLUTION INTRAVENOUS; SUBCUTANEOUS at 16:43

## 2024-09-30 RX ADMIN — SODIUM CHLORIDE, PRESERVATIVE FREE 10 ML: 5 INJECTION INTRAVENOUS at 20:26

## 2024-09-30 RX ADMIN — POTASSIUM CHLORIDE 40 MEQ: 1500 TABLET, EXTENDED RELEASE ORAL at 12:50

## 2024-09-30 RX ADMIN — TRAMADOL HYDROCHLORIDE 50 MG: 50 TABLET ORAL at 20:32

## 2024-09-30 RX ADMIN — METOCLOPRAMIDE 5 MG: 5 INJECTION, SOLUTION INTRAMUSCULAR; INTRAVENOUS at 09:40

## 2024-09-30 ASSESSMENT — PAIN DESCRIPTION - PAIN TYPE: TYPE: ACUTE PAIN

## 2024-09-30 ASSESSMENT — PAIN SCALES - GENERAL
PAINLEVEL_OUTOF10: 0
PAINLEVEL_OUTOF10: 4
PAINLEVEL_OUTOF10: 7
PAINLEVEL_OUTOF10: 7
PAINLEVEL_OUTOF10: 0

## 2024-09-30 ASSESSMENT — PAIN DESCRIPTION - ONSET: ONSET: ON-GOING

## 2024-09-30 ASSESSMENT — PAIN DESCRIPTION - ORIENTATION
ORIENTATION: LOWER
ORIENTATION: POSTERIOR

## 2024-09-30 ASSESSMENT — PAIN - FUNCTIONAL ASSESSMENT
PAIN_FUNCTIONAL_ASSESSMENT: ACTIVITIES ARE NOT PREVENTED
PAIN_FUNCTIONAL_ASSESSMENT: PREVENTS OR INTERFERES SOME ACTIVE ACTIVITIES AND ADLS

## 2024-09-30 ASSESSMENT — PAIN DESCRIPTION - DESCRIPTORS
DESCRIPTORS: ACHING;DISCOMFORT
DESCRIPTORS: ACHING

## 2024-09-30 ASSESSMENT — PAIN DESCRIPTION - FREQUENCY: FREQUENCY: INTERMITTENT

## 2024-09-30 ASSESSMENT — PAIN DESCRIPTION - LOCATION
LOCATION: BACK
LOCATION: BACK

## 2024-10-01 VITALS
OXYGEN SATURATION: 100 % | DIASTOLIC BLOOD PRESSURE: 85 MMHG | TEMPERATURE: 98.3 F | BODY MASS INDEX: 27.31 KG/M2 | SYSTOLIC BLOOD PRESSURE: 125 MMHG | RESPIRATION RATE: 17 BRPM | WEIGHT: 174 LBS | HEART RATE: 90 BPM | HEIGHT: 67 IN

## 2024-10-01 LAB
ALBUMIN SERPL-MCNC: 2.7 G/DL (ref 3.4–5)
ALBUMIN/GLOB SERPL: 0.6 (ref 0.8–1.7)
ALP SERPL-CCNC: 94 U/L (ref 45–117)
ALT SERPL-CCNC: 28 U/L (ref 13–56)
ANION GAP SERPL CALC-SCNC: 5 MMOL/L (ref 3–18)
AST SERPL-CCNC: 16 U/L (ref 10–38)
BASOPHILS # BLD: 0.1 K/UL (ref 0–0.1)
BASOPHILS NFR BLD: 1 % (ref 0–2)
BILIRUB SERPL-MCNC: 0.2 MG/DL (ref 0.2–1)
BUN SERPL-MCNC: 41 MG/DL (ref 7–18)
BUN/CREAT SERPL: 21 (ref 12–20)
CALCIUM SERPL-MCNC: 9.1 MG/DL (ref 8.5–10.1)
CHLORIDE SERPL-SCNC: 103 MMOL/L (ref 100–111)
CO2 SERPL-SCNC: 28 MMOL/L (ref 21–32)
CREAT SERPL-MCNC: 1.92 MG/DL (ref 0.6–1.3)
DIFFERENTIAL METHOD BLD: ABNORMAL
EOSINOPHIL # BLD: 1 K/UL (ref 0–0.4)
EOSINOPHIL NFR BLD: 10 % (ref 0–5)
ERYTHROCYTE [DISTWIDTH] IN BLOOD BY AUTOMATED COUNT: 16.4 % (ref 11.6–14.5)
GLOBULIN SER CALC-MCNC: 4.3 G/DL (ref 2–4)
GLUCOSE BLD STRIP.AUTO-MCNC: 173 MG/DL (ref 70–110)
GLUCOSE BLD STRIP.AUTO-MCNC: 181 MG/DL (ref 70–110)
GLUCOSE SERPL-MCNC: 121 MG/DL (ref 74–99)
HCT VFR BLD AUTO: 29.4 % (ref 35–45)
HGB BLD-MCNC: 8.9 G/DL (ref 12–16)
IMM GRANULOCYTES # BLD AUTO: 0.1 K/UL (ref 0–0.04)
IMM GRANULOCYTES NFR BLD AUTO: 1 % (ref 0–0.5)
LYMPHOCYTES # BLD: 3.3 K/UL (ref 0.9–3.6)
LYMPHOCYTES NFR BLD: 32 % (ref 21–52)
MCH RBC QN AUTO: 24.2 PG (ref 24–34)
MCHC RBC AUTO-ENTMCNC: 30.3 G/DL (ref 31–37)
MCV RBC AUTO: 79.9 FL (ref 78–100)
MONOCYTES # BLD: 1.1 K/UL (ref 0.05–1.2)
MONOCYTES NFR BLD: 10 % (ref 3–10)
NEUTS SEG # BLD: 4.8 K/UL (ref 1.8–8)
NEUTS SEG NFR BLD: 47 % (ref 40–73)
NRBC # BLD: 0 K/UL (ref 0–0.01)
NRBC BLD-RTO: 0 PER 100 WBC
PLATELET # BLD AUTO: 455 K/UL (ref 135–420)
PMV BLD AUTO: 10.9 FL (ref 9.2–11.8)
POTASSIUM SERPL-SCNC: 3.7 MMOL/L (ref 3.5–5.5)
PROT SERPL-MCNC: 7 G/DL (ref 6.4–8.2)
RBC # BLD AUTO: 3.68 M/UL (ref 4.2–5.3)
SODIUM SERPL-SCNC: 136 MMOL/L (ref 136–145)
WBC # BLD AUTO: 10.4 K/UL (ref 4.6–13.2)

## 2024-10-01 PROCEDURE — 36415 COLL VENOUS BLD VENIPUNCTURE: CPT

## 2024-10-01 PROCEDURE — 2580000003 HC RX 258: Performed by: INTERNAL MEDICINE

## 2024-10-01 PROCEDURE — 6370000000 HC RX 637 (ALT 250 FOR IP): Performed by: STUDENT IN AN ORGANIZED HEALTH CARE EDUCATION/TRAINING PROGRAM

## 2024-10-01 PROCEDURE — 94761 N-INVAS EAR/PLS OXIMETRY MLT: CPT

## 2024-10-01 PROCEDURE — 6360000002 HC RX W HCPCS: Performed by: STUDENT IN AN ORGANIZED HEALTH CARE EDUCATION/TRAINING PROGRAM

## 2024-10-01 PROCEDURE — 6370000000 HC RX 637 (ALT 250 FOR IP): Performed by: INTERNAL MEDICINE

## 2024-10-01 PROCEDURE — 80053 COMPREHEN METABOLIC PANEL: CPT

## 2024-10-01 PROCEDURE — 85025 COMPLETE CBC W/AUTO DIFF WBC: CPT

## 2024-10-01 PROCEDURE — 82962 GLUCOSE BLOOD TEST: CPT

## 2024-10-01 RX ORDER — FUROSEMIDE 20 MG
20 TABLET ORAL DAILY PRN
Qty: 90 TABLET | Refills: 1 | Status: SHIPPED | OUTPATIENT
Start: 2024-10-01

## 2024-10-01 RX ORDER — PANTOPRAZOLE SODIUM 40 MG/1
40 FOR SUSPENSION ORAL
COMMUNITY

## 2024-10-01 RX ORDER — LIDOCAINE 50 MG/G
1 PATCH TOPICAL EVERY 24 HOURS
COMMUNITY

## 2024-10-01 RX ORDER — PREGABALIN 150 MG/1
150 CAPSULE ORAL 3 TIMES DAILY
COMMUNITY

## 2024-10-01 RX ORDER — METOCLOPRAMIDE 10 MG/1
10 TABLET ORAL 4 TIMES DAILY PRN
COMMUNITY

## 2024-10-01 RX ORDER — INSULIN GLARGINE 100 [IU]/ML
16 INJECTION, SOLUTION SUBCUTANEOUS EVERY MORNING
COMMUNITY

## 2024-10-01 RX ORDER — ALBUTEROL SULFATE 90 UG/1
1-2 INHALANT RESPIRATORY (INHALATION) EVERY 6 HOURS PRN
COMMUNITY

## 2024-10-01 RX ORDER — ONDANSETRON 4 MG/1
4 TABLET, FILM COATED ORAL EVERY 8 HOURS PRN
COMMUNITY

## 2024-10-01 RX ADMIN — SODIUM CHLORIDE, PRESERVATIVE FREE 10 ML: 5 INJECTION INTRAVENOUS at 10:07

## 2024-10-01 RX ADMIN — TRAMADOL HYDROCHLORIDE 50 MG: 50 TABLET ORAL at 05:11

## 2024-10-01 RX ADMIN — PANTOPRAZOLE SODIUM 40 MG: 40 TABLET, DELAYED RELEASE ORAL at 05:11

## 2024-10-01 RX ADMIN — ENOXAPARIN SODIUM 40 MG: 100 INJECTION SUBCUTANEOUS at 10:06

## 2024-10-01 RX ADMIN — FOLIC ACID 1 MG: 1 TABLET ORAL at 10:07

## 2024-10-01 RX ADMIN — Medication 100 MG: at 10:07

## 2024-10-01 RX ADMIN — INSULIN LISPRO 3 UNITS: 100 INJECTION, SOLUTION INTRAVENOUS; SUBCUTANEOUS at 13:00

## 2024-10-01 RX ADMIN — INSULIN LISPRO 3 UNITS: 100 INJECTION, SOLUTION INTRAVENOUS; SUBCUTANEOUS at 07:00

## 2024-10-01 RX ADMIN — METOCLOPRAMIDE 5 MG: 5 INJECTION, SOLUTION INTRAMUSCULAR; INTRAVENOUS at 10:06

## 2024-10-01 ASSESSMENT — PAIN - FUNCTIONAL ASSESSMENT: PAIN_FUNCTIONAL_ASSESSMENT: PREVENTS OR INTERFERES SOME ACTIVE ACTIVITIES AND ADLS

## 2024-10-01 ASSESSMENT — PAIN DESCRIPTION - LOCATION: LOCATION: BACK;HEAD

## 2024-10-01 ASSESSMENT — PAIN DESCRIPTION - DESCRIPTORS: DESCRIPTORS: ACHING;DISCOMFORT

## 2024-10-01 ASSESSMENT — PAIN DESCRIPTION - ONSET: ONSET: ON-GOING

## 2024-10-01 ASSESSMENT — PAIN SCALES - GENERAL
PAINLEVEL_OUTOF10: 0
PAINLEVEL_OUTOF10: 0
PAINLEVEL_OUTOF10: 7

## 2024-10-01 ASSESSMENT — PAIN DESCRIPTION - ORIENTATION: ORIENTATION: POSTERIOR;ANTERIOR

## 2024-10-01 ASSESSMENT — PAIN DESCRIPTION - PAIN TYPE: TYPE: ACUTE PAIN

## 2024-10-01 ASSESSMENT — PAIN DESCRIPTION - FREQUENCY: FREQUENCY: INTERMITTENT

## 2024-10-01 NOTE — DISCHARGE INSTRUCTIONS
Follow-up with your Primary Care Provider (a.k.a. PCP) in about 1 week.    Purchase a scale and weight yourself on a daily basis.  Take note when you have gained greater-than-or-equal-to 2 pounds in 24 hours or when you have gained greater-than-or-equal-to 5 pounds in 7 days.  If you noticed that your legs are swollen, that you are short of breath with exertion, or if you are having difficulty breathing when lying flat, take a PRN dose of Furosemide that day and contact your Primary Care Provider (a.k.a. PCP).    Also, please Fluid Restrict yourself to 1.8 L/day of Fluid (i.e. 1,800 mL) and attempt to measure out your daily fluid in-take to prevent Volume Overload.    If you begin to have shortness of breath, faintness, weakness, fatigue, lose consciousness, or cannot catch your breath, go to an Emergency Room for medical evaluation.

## 2024-10-01 NOTE — PROGRESS NOTES
09/24/24 2042   Oxygen Therapy/Pulse Ox   O2 Therapy Oxygen humidified   $Oxygen $Daily Charge   O2 Device Heated high flow cannula   O2 Flow Rate (L/min) 40 L/min   FiO2  90 %   Respirations 22   SpO2 93 %   Skin Assessment Clean, dry, & intact     Pt placed on HFNC post ABG. MD notified.   
   09/25/24 0013   NIV Type   Suction Setup and Functional Yes   NIV Started/Stopped On   Equipment Type [id number] [id number] [id number]   Mode Bilevel   Mask Type Full face mask   Mask Size Small   Bonnet size Small   Assessment   Pulse (!) 114   Respirations 21   BP (!) 161/106   SpO2 100 %   Level of Consciousness 1   Comfort Level Good   Using Accessory Muscles Yes   Mask Compliance Good   Skin Assessment Clean, dry, & intact   Breath Sounds   Respiratory Pattern Tachypneic   Breath Sounds Bilateral Coarse crackles   Settings/Measurements   PIP Observed 17 cm H20   IPAP 16 cmH20   CPAP/EPAP 10 cmH2O   Vt (Measured) 763 mL   Rate Ordered 8   Insp Rise Time (%) 3 %   FiO2  100 %   I Time/ I Time % 0.9 s   Minute Volume (L/min) 15.2 Liters   Mask Leak (lpm) 36 lpm   Patient's Home Machine No   Alarm Settings   Alarms On Y   Low Pressure (cmH2O) 8 cmH2O   High Pressure (cmH2O) 40 cmH2O   Apnea (secs) 20 secs   RR Low (bpm) 8   RR High (bpm) 40 br/min     Abg obtain and pt switched from HFNC to Bipap. O2 sat improved to 100%. MD is aware.   
  Dallin Nava [facility] Hospitalist Group  Progress Note    Patient: Aileen Bonilla Age: 39 y.o. : 1985 MR#: [id number] SSN: [id number]  Date: 2024                 DVT Prophylaxis:  [x]Lovenox  []Hep SQ  []SCDs  []Coumadin   []On Heparin gtt []PO anticoagulant    Anticipated discharge: 2024 to home, pending clinical course and resolution of hypoxia    Subjective:     The patient was seen and examined at the bedside in follow-up for acute hypoxemic respiratory failure secondary to pulmonary edema among other issues.  Patient is still on BiPAP.  She was on 80% FiO2.  However she was more alert than yesterday.  She complained that her mouth feels dry.  I explained to her that the dryness of the mouth is likely because of the high airflow.  Unfortunately, she has been BiPAP dependent since admission.  She denied any chest pain, palpitations or lightheadedness.      Objective:   VS: BP (!) 140/85   Pulse (!) 114   Temp 98.7 °F (37.1 °C) (Axillary)   Resp 22   Ht 1.702 m (5' 7\")   Wt 81.6 kg (180 lb)   SpO2 100%   BMI 28.19 kg/m²    Tmax/24hrs: Temp (24hrs), Av.3 °F (37.4 °C), Min:98.4 °F (36.9 °C), Max:100.1 °F (37.8 °C)    Intake/Output Summary (Last 24 hours) at 2024 0835  Last data filed at 2024 0614  Gross per 24 hour   Intake --   Output 3800 ml   Net -3800 ml        PHYSICAL EXAM  General Appearance: Alert and awake   HENT: normocephalic/atraumatic, moist mucus membranes  Neck: No JVD, supple  Lungs: Diminished breath sounds bilaterally; patient is on NIV   CV: Tachycardic but rhythm regular, no m/r/g  Abdomen: soft, non-tender, normal bowel sounds  Extremities: no cyanosis, no peripheral edema  Neuro: No focal deficits, motor/sensory intact  Skin: Normal color, intact  Psych: appropriate affect, alert and oriented to person, place and time    Current Facility-Administered Medications   Medication Dose Route Frequency    sodium chloride flush 0.9 % 
  Dallin Nava [facility] Hospitalist Group  Progress Note    Patient: Aileen Bonilla Age: 39 y.o. : 1985 MR#: [id number] SSN: [id number]  Date: 2024                 DVT Prophylaxis:  []Lovenox  []Hep SQ  []SCDs  []Coumadin   []On Heparin gtt []PO anticoagulant    Anticipated discharge: 2024 to home, pending clinical course and resolution of hypoxia    Subjective:     The patient was seen and examined at the bedside.  She was on 25 L and 50% FiO2 via HFNC.  She said that she feels better than yesterday.  Her shortness of breath is improved.  She reported good control of her back pain with the lidocaine patch.    Objective:   VS: BP (!) 142/79   Pulse 96   Temp 98.8 °F (37.1 °C) (Oral)   Resp 18   Ht 1.702 m (5' 7\")   Wt 77.6 kg (171 lb 1.6 oz)   SpO2 98%   BMI 26.80 kg/m²    Tmax/24hrs: Temp (24hrs), Av.4 °F (36.9 °C), Min:97 °F (36.1 °C), Max:98.9 °F (37.2 °C)    Intake/Output Summary (Last 24 hours) at 2024 0912  Last data filed at 2024 0328  Gross per 24 hour   Intake 1390.83 ml   Output 1000 ml   Net 390.83 ml        PHYSICAL EXAM  General Appearance: Alert and awake   HENT: normocephalic/atraumatic, moist mucus membranes; pupils equal, reactive to light and accommodation; extraocular muscles intact  Neck: No JVD, supple  Lungs: Faint inspiratory crackles auscultated at the bases bilaterally  CV: Rate and rhythm regular, no m/r/g  Abdomen: soft, non-tender, normal bowel sounds  Extremities: no cyanosis, no peripheral edema  Neuro: No focal deficits, motor/sensory intact  Skin: Normal color, intact  Psych: appropriate affect, alert and oriented to person, place and time    Current Facility-Administered Medications   Medication Dose Route Frequency    insulin glargine (LANTUS) injection vial 5 Units  5 Units SubCUTAneous Nightly    glucose chewable tablet 16 g  4 tablet Oral PRN    dextrose bolus 10% 125 mL  125 mL IntraVENous PRN    Or    dextrose 
 conducted an initial consultation and Spiritual Assessment for Aileen Bonilla, who is a 39 y.o.,female. Patient's Primary Language is: English.   According to the patient's EMR Congregational Affiliation is: None.     The reason the Patient came to the hospital is:   Patient Active Problem List    Diagnosis Date Noted    Acute decompensated heart failure (HCC) 09/24/2024        The  provided the following Interventions:  Initiated a relationship of care and support.   Explored issues of leeann, belief, spirituality and Denominational/ritual needs while hospitalized.  Listened empathically.  Provided chaplaincy education concerning Advance Medical Directive.  Provided information about Spiritual Care Services.  Offered prayer and assurance of continued prayers on patient's behalf.   Chart reviewed.    The following outcomes where achieved:  Patient shared limited information about both their medical narrative and spiritual journey/beliefs.   confirmed Patient's Congregational Affiliation.  Patient processed feeling about current hospitalization.  Patient expressed gratitude for 's visit.    Assessment:  Patient does not have any Denominational/cultural needs that will affect patient's preferences in health care.  There are no spiritual or Denominational issues which require intervention at this time.     Plan:  Chaplains will continue to follow and will provide pastoral care on an as needed/requested basis.   recommends bedside caregivers page  on duty if patient shows signs of acute spiritual or emotional distress.    Patient alert and getting oxygen. Somewhat passive but receptive to visit. Offered availability and provided prayer.     Slava Sunray   Staff   Spiritual Health  [phone]  
0700  called and left message for Dr. Hudson   patient is not able to come off BiPAP without critical low oxygen even on HHHNC 100% SpO2 48%   RT place patient back on BiPAP it took a long time to get her SpO2 finally got the FIO2 down to 70% BUT (unable to maintain oxygenation off BiPAP)      RT needs assistance Francoise Dahl [phone]  
0715: Bedside shift change report given to Claribel/GLADYS Bearden (oncoming nurse) by Fauzia (offgoing nurse). Report included the following information Nurse Handoff Report, Adult Overview, Intake/Output, MAR, Recent Results, and Cardiac Rhythm Sinus Tachy .    0715: 4 Eyes Skin Assessment     NAME:  Aileen Bonilla  YOB: 1985  MEDICAL RECORD NUMBER:  [id number]    The patient is being assessed for  Shift Handoff    I agree that at least one RN has performed a thorough Head to Toe Skin Assessment on the patient. ALL assessment sites listed below have been assessed.      Areas assessed by both nurses:    Head, Face, Ears, Shoulders, Back, Chest, Arms, Elbows, Hands, Sacrum. Buttock, Coccyx, Ischium, Legs. Feet and Heels, and Under Medical Devices         Does the Patient have a Wound? No noted wound(s)       Rio Prevention initiated by RN: No  Wound Care Orders initiated by RN: No    Pressure Injury (Stage 3,4, Unstageable, DTI, NWPT, and Complex wounds) if present, place Wound referral order by RN under : No    New Ostomies, if present place, Ostomy referral order under : No     Nurse 1 eSignature: Electronically signed by Jennyfer Sanford RN on 9/26/24 at 0715 AM EDT    **SHARE this note so that the co-signing nurse can place an eSignature**    Nurse 2 eSignature: Electronically signed by GLADYS Cage on 9/26/24 at 0715 AMEDT    0730: Vitals, POC Blood Glucose, and pain assesment completed.    0815: Morning Meds given    0830: Rounding on pt, pain reassessed , Call bell in reach    0900: Rounded on pt, reassessed pain, Call bell in reach    0930: Rounded on pt reassessed pain, Call bell in reach    1000: rounded on pt reassessed pain, Call bell in reach    1030: rounded on pt, pt sitting at bedside crying stated \"I don't want want to be here anymore\" Spoke to pt, pt was calm upon RN leaving room. Call bell in reach    1100: Reassessed pain, Call bell in reach    1130: 
0715: Bedside shift change report given to Claribel/Monisha RN (oncoming nurse) by Shannon RN (offgoing nurse). Report included the following information Nurse Handoff Report, Adult Overview, Intake/Output, MAR, Recent Results, and Cardiac Rhythm Sinus Tach .     0730: Pt on Bedpan upon entry. POC Blood Glucose taken. Pt taken off bedpan and aramis care completed /martha pad changed- urinary output charted.    0815: Pt put back on bedpan, taken off and urinary output charted and aramis care completed. Morning medications administered.   Insulin lispro held due to order parameters not being met. Mepolex placed pts bridge of nose.    0900: Pt put back on bed pan, taken off and urinary output and aramis care completed.Pt complaining of severe back and buttock pain. Repositioned in bed and provided heat pack.    0920: Pt reports no change in pain.    0930: Notified provider, provider to place orders.    0945: One time dose pain med administered per prn order.    1015: Upon reassessment pain interventions were successful.    1100: Rounded on patient, obtained POC Blood glucose and vitals. Reassessment completed.     1130: Paged provider to request Reglan prescription be switched from po it IV as pt is currently still on Bipap, provider to place orders.    1245 Rounding on patient. Patient's asleep.     1315: Linen, gown, bed pad changed.     1400: Medication Given    1440: Rounding on patient, patient is resting    1500: Rounding on patient, pt sitting up to make phone call. Reassessment completed.    1530: Vitals taken, med given, patient stated pain was 8 out of 10. Repositioned her and encouraged her to rest.    1600: Rounding on patient, reassessed pain, RT at bedside tried taking patient off BIPAP and pt de sat quickly. RT placed pt back on BIPAP    1630: Obtained POC Blood Glucose    1715: Administered IV Furosemide, assisted pt to bedside commode and back into bed. Assessed pt vitals...    1800: Rounding on Pt, assisted to 
0830 - Patient alert & oriented x4 requesting to come off the bipap. Respiratory paged patient transitioned to hi-flow. Able to eat breakfast up to the [facility] x3.       1230- Patient up to the [facility] desat to the 70's patient placed back in the bed told we will just do the bedpan at this time. Patient complaining of increased amount of dizziness. Placed patient back in the bed oxygen in the 90s at this time    1240- Patient pulled hi-flow off repositioned an education given     1250- CCL into the room at this time patient pulled hi-flow off oxygen into the 50's. Respiratory called patient placed back on the bipap. Page made to MD Hudson. Patient increasingly lethargic.   
0830: Pt removed from BIPAP and placed on HiFlow 25L 40% and assisted to recliner. Tolerated well, O2 100%.    1133: Pt removed from HiFlow 25L 40% and placed on 3L NC. Tolerating well. Ambulating in room.  
4 Eyes Skin Assessment     NAME:  Aileen Bonilla  YOB: 1985  MEDICAL RECORD NUMBER:  [id number]    The patient is being assessed for  Shift Handoff    I agree that at least one RN has performed a thorough Head to Toe Skin Assessment on the patient. ALL assessment sites listed below have been assessed.      Areas assessed by both nurses:    Head, Face, Ears, Shoulders, Back, Chest, Arms, Elbows, Hands, Sacrum. Buttock, Coccyx, Ischium, Legs. Feet and Heels, and Under Medical Devices         Does the Patient have a Wound? No noted wound(s)       Rio Prevention initiated by RN: No  Wound Care Orders initiated by RN: No    Pressure Injury (Stage 3,4, Unstageable, DTI, NWPT, and Complex wounds) if present, place Wound referral order by RN under : No    New Ostomies, if present place, Ostomy referral order under : No     Nurse 1 eSignature: Electronically signed by RAEGAN GARCIA RN on 9/27/24 at 6:22 PM EDT    **SHARE this note so that the co-signing nurse can place an eSignature**    Nurse 2 eSignature: Electronically signed by Elizabeth Daniel RN on 9/27/24 at 7:38 PM EDT   
4 Eyes Skin Assessment     NAME:  Aileen Bonilla  YOB: 1985  MEDICAL RECORD NUMBER:  [id number]    The patient is being assessed for  Shift Handoff    I agree that at least one RN has performed a thorough Head to Toe Skin Assessment on the patient. ALL assessment sites listed below have been assessed.      Areas assessed by both nurses:    Head, Face, Ears, Shoulders, Back, Chest, Arms, Elbows, Hands, Sacrum. Buttock, Coccyx, Ischium, Legs. Feet and Heels, and Under Medical Devices         Does the Patient have a Wound? No noted wound(s)       Rio Prevention initiated by RN: No  Wound Care Orders initiated by RN: No    Pressure Injury (Stage 3,4, Unstageable, DTI, NWPT, and Complex wounds) if present, place Wound referral order by RN under : No    New Ostomies, if present place, Ostomy referral order under : No     Nurse 1 eSignature: Electronically signed by Annabel Tubbs RN on 10/1/24 at 7:52 AM EDT    **SHARE this note so that the co-signing nurse can place an eSignature**    Nurse 2 eSignature: Electronically signed by Princess Osorio RN on 10/1/24 at 8:33 AM EDT   
4 Eyes Skin Assessment     NAME:  Aileen Bonilla  YOB: 1985  MEDICAL RECORD NUMBER:  [id number]    The patient is being assessed for  Shift Handoff    I agree that at least one RN has performed a thorough Head to Toe Skin Assessment on the patient. ALL assessment sites listed below have been assessed.      Areas assessed by both nurses:    Shoulders, Back, Chest, Sacrum. Buttock, Coccyx, Ischium, and Legs. Feet and Heels        Does the Patient have a Wound? No noted wound(s)       Rio Prevention initiated by RN: No  Wound Care Orders initiated by RN: No    Pressure Injury (Stage 3,4, Unstageable, DTI, NWPT, and Complex wounds) if present, place Wound referral order by RN under : No    New Ostomies, if present place, Ostomy referral order under : No     Nurse 1 eSignature: Electronically signed by Snow Heller RN on 9/27/24 at 6:58 AM EDT    **SHARE this note so that the co-signing nurse can place an eSignature**    Nurse 2 eSignature: Electronically signed by RAEGAN GARCIA RN on 9/27/24 at 7:20 AM EDT   
4 Eyes Skin Assessment     NAME:  Aileen Bonilla  YOB: 1985  MEDICAL RECORD NUMBER:  [id number]    The patient is being assessed for  Shift Handoff    I agree that at least one RN has performed a thorough Head to Toe Skin Assessment on the patient. ALL assessment sites listed below have been assessed.      Areas assessed by both nurses:    Head, Face, Ears and Legs. Feet and Heels        Does the Patient have a Wound? No noted wound(s)       Rio Prevention initiated by RN: No  Wound Care Orders initiated by RN: No    Pressure Injury (Stage 3,4, Unstageable, DTI, NWPT, and Complex wounds) if present, place Wound referral order by RN under : No    New Ostomies, if present place, Ostomy referral order under : No     Nurse 1 eSignature: Electronically signed by PAULA AGUILAR RN on 9/28/24 at 8:17 PM EDT    **SHARE this note so that the co-signing nurse can place an eSignature**    Nurse 2 eSignature: Electronically signed by GLADYS Hale on 9/28/24 at 8:17 PM EDT   
4 Eyes Skin Assessment     NAME:  Aileen Bonilla  YOB: 1985  MEDICAL RECORD NUMBER:  [id number]    The patient is being assessed for  Shift Handoff    I agree that at least one RN has performed a thorough Head to Toe Skin Assessment on the patient. ALL assessment sites listed below have been assessed.      Areas assessed by both nurses:    Head, Face, Ears, Shoulders, Back, Chest, Arms, Elbows, Hands, Sacrum. Buttock, Coccyx, Ischium, Legs. Feet and Heels, and Under Medical Devices         Does the Patient have a Wound? No noted wound(s)       Rio Prevention initiated by RN: Yes  Wound Care Orders initiated by RN: No    Pressure Injury (Stage 3,4, Unstageable, DTI, NWPT, and Complex wounds) if present, place Wound referral order by RN under : No    New Ostomies, if present place, Ostomy referral order under : No     Nurse 1 eSignature: Electronically signed by Elizabeth Daniel RN on 9/28/24 at 3:24 AM EDT    **SHARE this note so that the co-signing nurse can place an eSignature**    Nurse 2 eSignature: Electronically signed by PAULA AGUILAR RN on 9/28/24 at 8:01 AM EDT   
4 Eyes Skin Assessment     NAME:  Aileen Bonilla  YOB: 1985  MEDICAL RECORD NUMBER:  [id number]    The patient is being assessed for  Shift Handoff    I agree that at least one RN has performed a thorough Head to Toe Skin Assessment on the patient. ALL assessment sites listed below have been assessed.      Areas assessed by both nurses:    Head, Face, Ears, Shoulders, Back, Chest, Arms, Elbows, Hands, Sacrum. Buttock, Coccyx, Ischium, Legs. Feet and Heels, and Under Medical Devices         Does the Patient have a Wound? No noted wound(s)       Rio Prevention initiated by RN: Yes  Wound Care Orders initiated by RN: No    Pressure Injury (Stage 3,4, Unstageable, DTI, NWPT, and Complex wounds) if present, place Wound referral order by RN under : No    New Ostomies, if present place, Ostomy referral order under : No     Nurse 1 eSignature: Electronically signed by Ladarius Dooley RN on 9/30/24 at 7:07 PM EDT    **SHARE this note so that the co-signing nurse can place an eSignature**    Nurse 2 eSignature: {Esignature:[id number]}    
4 Eyes Skin Assessment     NAME:  Aileen Bonilla  YOB: 1985  MEDICAL RECORD NUMBER:  [id number]    The patient is being assessed for  Shift Handoff    I agree that at least one RN has performed a thorough Head to Toe Skin Assessment on the patient. ALL assessment sites listed below have been assessed.      Areas assessed by both nurses:    Head, Face, Ears, Shoulders, Back, Chest, Arms, Elbows, Hands, Sacrum. Buttock, Coccyx, Ischium, Legs. Feet and Heels, and Under Medical Devices         Does the Patient have a Wound? No noted wound(s)       Rio Prevention initiated by RN: No  Wound Care Orders initiated by RN: No    Pressure Injury (Stage 3,4, Unstageable, DTI, NWPT, and Complex wounds) if present, place Wound referral order by RN under : No    New Ostomies, if present place, Ostomy referral order under : No     Nurse 1 eSignature: Electronically signed by Geoffrey Leigh RN on 9/29/24 at 7:02 AM EDT    **SHARE this note so that the co-signing nurse can place an eSignature**    Nurse 2 eSignature: GLADYS Gibbons  4679: Bedside and Verbal shift change report given to GLADYS Gibbons (oncoming nurse) by GLADYS Hale (offgoing nurse). Report included the following information Nurse Handoff Report, ED Encounter Summary, ED SBAR, Adult Overview, Intake/Output, Recent Results, Med Rec Status, Quality Measures, Neuro Assessment, and Event Log.    
4 Eyes Skin Assessment     NAME:  Aileen Bonilla  YOB: 1985  MEDICAL RECORD NUMBER:  [id number]    The patient is being assessed for  Shift Handoff    I agree that at least one RN has performed a thorough Head to Toe Skin Assessment on the patient. ALL assessment sites listed below have been assessed.      Areas assessed by both nurses:    Sacrum. Buttock, Coccyx, Ischium and Legs. Feet and Heels        Does the Patient have a Wound? No noted wound(s)       Rio Prevention initiated by RN: Yes  Wound Care Orders initiated by RN: Yes    Pressure Injury (Stage 3,4, Unstageable, DTI, NWPT, and Complex wounds) if present, place Wound referral order by RN under : No    New Ostomies, if present place, Ostomy referral order under : No     Nurse 1 eSignature: Electronically signed by HUMERA DAI RN on 9/29/24 at 7:23 PM EDT    **SHARE this note so that the co-signing nurse can place an eSignature**    Nurse 2 eSignature: Electronically signed by GLADYS Hale on 9/29/24 at 7:24 PM EDT   
4 Eyes Skin Assessment     NAME:  Aileen Bonilla  YOB: 1985  MEDICAL RECORD NUMBER:  [id number]    The patient is being assessed for  Admission    I agree that at least one RN has performed a thorough Head to Toe Skin Assessment on the patient. ALL assessment sites listed below have been assessed.      Areas assessed by both nurses:    Head, Face, Ears, Shoulders, Back, Chest, Arms, Elbows, Hands, Sacrum. Buttock, Coccyx, Ischium, Legs. Feet and Heels, and Under Medical Devices         Does the Patient have a Wound? No noted wound(s)       Rio Prevention initiated by RN: Yes  Wound Care Orders initiated by RN: No    Pressure Injury (Stage 3,4, Unstageable, DTI, NWPT, and Complex wounds) if present, place Wound referral order by RN under : No    New Ostomies, if present place, Ostomy referral order under : No     Nurse 1 eSignature: Electronically signed by Shannon Funes RN on 9/25/24 at 7:14 AM EDT    **SHARE this note so that the co-signing nurse can place an eSignature**    Nurse 2 eSignature: GLADYS Jin   
ABG results did not cross over    Ph 7.38, pco34.1, po2 64.6, hco3 20.5, BE -3.8, so2 92.3 on NRB.   
ABG results did not cross over-     Ph 7.40, pco2 32, po2 44, hco3 20, po2 80% on HFNC 40L at 100%fio2.   
ABG results possible Ruso ABG repeat needed but patient is getting Xray and echo at bedside.   Latest Reference Range & Units 09/26/24 10:00   DEVICE -   BIPAP MASK   Site -   RIGHT RADIAL   Mode -   BILEVEL   POC pH 7.35 - 7.45   7.45   POC pCO2 35.0 - 48.0 MMHG 40.7   POC PO2 83 - 108 MMHG 42 (LL)   POC HCO3 21 - 28 MMOL/L 28.3 (H)   POC O2 SAT 94 - 98 % 79 (L)   POC TCO2 22 - 29 MMOL/L 28   POC PEEP/CPA -   10   POC TIDAL VOLUME -   479   POC Pressure Support -   6   Base Excess mmol/L 4.0   (LL): Data is critically low  (H): Data is abnormally high  (L): Data is abnormally low  
Bedside and Verbal shift change report given to GLADYS Betancourt and Kanu Ding RN  (oncoming nurse) by GLADYS Hale (offgoing nurse). Report included the following information Nurse Handoff Report, Adult Overview, and Intake/Output.      Bedside and Verbal shift change report given to GLADYS Hale (oncoming nurse) by GLADYS Betancourt and Kanu Ding RN  (offgoing nurse). Report included the following information Nurse Handoff Report, Adult Overview, and Intake/Output.    
Bedside and Verbal shift change report given to GLADYS Betancourt and [place] GLADYS Ding (oncoming nurse) by GLADYS Johnson  (offgoing nurse). Report included the following information Nurse Handoff Report and Adult Overview.    
Bedside and verbal report given to Celine RN. All opportunities for questions given.   
Blood pressure is now elevated and patient is still requiring high FiO2  Will start nitroglycerin drip  
Cardiology Progress Note    Admit Date: 9/24/2024  Attending Cardiologist: Dr. Andrews    IMPRESSION  Acute hypoxic respiratory failure requiring BiPAP  Acute on chronic heart failure preserved ejection fraction, as observed by elevated NT proBNP greater than 23,000 on 9/24/2024, chest x-ray 9/26/2024 improving diffuse bilateral pulmonary infiltrates, 2D echo 9/26/2024 ejection fraction 55 to 60%  NSTEMI likely type II in context supply/demand mismatch decompensated congestive heart failure, EKG 9/24/2024 sinus tachycardia no significant change, left heart catheterizations at [facility] without obstructive coronary disease May 2024  Coronary risk equivalent CKD  Coronary risk equivalent diabetes mellitus  History of GI bleed status post EGD 5/20/2024 esophagitis with abnormal appearing mucosa in the second portion of the duodenum    PLAN  Monitor fluid status, adjust as necessary, fluid restriction, salt intake <2g per day.  Continue Lasix 40 mg IV every 8 hours  Recommend Guideline Directed medical therapy as can tolerate.  Statin if can tolerate prior to discharge.  Monitor blood pressure, adjust antihypertensive medications as necessary.  Continue Norvasc 5 mg p.o. daily, continue Isordil 10 mg p.o. 3 times daily  Recommended aspirin 81 mg p.o. daily if can tolerate prior to discharge    Thank you for this consultation and for allowing us to participate in this patient's care.    Primary cardiologist is at [facility]    Subjective:     Denies chest pain  Denies shortness of breath while on oxygen  Denies abdominal pain    Objective:      Patient Vitals for the past 8 hrs:   Temp Pulse Resp BP SpO2   09/26/24 1729 -- -- 22 -- --   09/26/24 1659 -- -- 17 -- --   09/26/24 1601 98.3 °F (36.8 °C) (!) 121 22 (!) 148/74 99 %   09/26/24 1500 -- (!) 123 -- -- --   09/26/24 1400 -- (!) 121 21 -- 99 %   09/26/24 1337 -- (!) 122 22 -- 98 %   09/26/24 1310 -- -- 22 -- --   09/26/24 1300 -- (!) 126 -- -- --   09/26/24 1240 -- -- 23 -- 
[facility]  Pulmonary, Critical Care, and Sleep Medicine    Pulmonary Medicine Progress Note    Name: Aileen Bonilla MRN: [id number]  : 1985 Hospital: [facility]  Date: 2024       Subjective:  Pt is doing much better. Remains on 40% fio2. Standing up in the room. Breathing better. Some clear sputum.     Patient Active Problem List   Diagnosis    Acute on chronic heart failure with preserved ejection fraction ([facility])    Type 2 diabetes mellitus with diabetic autonomic neuropathy, with long-term current use of insulin ([facility])    Gastroparesis    Acute respiratory failure with hypoxia    Primary hypertension    Acute renal failure with acute renal cortical necrosis superimposed on stage 3a chronic kidney disease ([facility])    Asymptomatic bacteriuria    Lactic acidemia    NSTEMI (non-ST elevated myocardial infarction) ([facility])    Shortness of breath       Assessment/PLAN:  IMPRESSION:   Acute Hypoxic Respiratory Failure  - 2/2 pulm edema most likely  - on HFNC 50%, s/p bipap  Hx of cardiomyopathy pEF  - likely diastolic HF  CKD3  DM2  Hx of GI bleed- 2024- esophagitis  Hx of EtOH abuse  - reportedly stopped a couple months ago       RECOMMENDATIONS:   Wean off hfnc, spoke to RT  Not much recorded urine but receiving diuretics  Cr slowly improving.  CXR stable from yesterday  If improved with diuresis, I don't think there a reason for a v/q scan to r/o PE especially given her hx of GI bleed and risk for AC. Her dopplers were negative so filter would not be needed anyway.   Aspiration precautions  Assess home Oxygen needs at discharge  OT, PT, OOB and ambulate  Will Follow     FiO2 to keep SpO2 >=92%, HOB >=30 degree, aspiration precautions, aggressive pulmonary toileting, incentive spirometry.    Other issues management by primary team and respective consultants.    Events and notes from last 24 hours reviewed.   Discussed with patient and family, answered all questions to their 
Paged  @ [phone] : DR. Meyer still waiting response     RT has concerns about patient not being able to be off BiPAP  without critical de sat to low 50's quick even  on HHHNC at 100% , then  when placed back on BiPAP patient take a while for her oxygent to come up!    Turned FIO2 on BiPAP down to  70 % . SpO2 on monitor is {100% only on BiPAP }    RT needs assistance Francoise Dahl [phone]  
Patient placed on heated high flow NC  with non re breather on 100% patient stable at this time will continue to try to wean FIO2  
Patient requested trazodone for sleep.  Note that patient is on Ultram as needed, last dose was 9/25/2024 at 6 AM.  Will give one-time dose of trazodone.    Sugars 76- 134, a.m. Lantus held.  
Patient resting on BiPAP comfortably RT plans to keep patient on BiPAP to get over her hypoxia when she wakes up will try HHNC  
[facility] Renal Dosing Services      Previous Regimen Metoclopramide 10mg IV tid   Serum Creatinine No results found for: \"FRANNIE\", \"CREAPOC\"   Creatinine Clearance Estimated Creatinine Clearance: 48 mL/min (A) (based on SCr of 1.68 mg/dL (H)).   BUN Lab Results   Component Value Date/Time    BUN 41 09/29/2024 08:02 AM           The following medication: metoclopramid was automatically dose-adjusted per [facility] P&T Committee Protocol, with respect to renal function.      Dosage changed to:  5mg IV tid    Additional notes:    Pharmacy to continue to monitor patient daily.   Will make dosage adjustments based upon changing renal function.  Signed ANGUS GAY RPH.     
Placed pt back on BiPAP due to desaturation and pulling NC off.      09/27/24 1251   NIV Type   Suction Setup and Functional Yes   NIV Started/Stopped (S)  On   Equipment Type V60   Mode Bilevel   Mask Type Full face mask   Mask Size Medium   Assessment   Pulse (!) 106   Respirations 21   SpO2 93 %   Level of Consciousness 0   Using Accessory Muscles No   Mask Compliance Fair   Skin Assessment Clean, dry, & intact   Skin Protection for O2 Device Yes   Orientation Anterior;Middle   Location Forehead;Nose   Intervention(s) Reposition Device   Breath Sounds   Respiratory Pattern Regular   Breath Sounds Bilateral (S)  Diminished;Fine crackles   Settings/Measurements   PIP Observed 10 cm H20   IPAP 10 cmH20   CPAP/EPAP 5 cmH2O   Vt (Measured) 485 mL   Rate Ordered 10   FiO2  50 %   I Time/ I Time % 1 s   Minute Volume (L/min) 10.3 Liters   Mask Leak (lpm) 40 lpm   Patient's Home Machine No   Alarm Settings   Alarms On Y   Low Pressure (cmH2O) 8 cmH2O   High Pressure (cmH2O) 40 cmH2O   Apnea (secs) 20 secs   RR Low (bpm) 8   RR High (bpm) 40 br/min   Care Plan - Respiratory Goals   Achieves optimal ventilation and oxygenation Assess for changes in respiratory status;Assess for changes in mentation and behavior;Position to facilitate oxygenation and minimize respiratory effort;Oxygen supplementation based on oxygen saturation or arterial blood gases;Encourage broncho-pulmonary hygiene including cough, deep breathe, incentive spirometry;Assess the need for suctioning and aspirate as needed;Assess and instruct to report shortness of breath or any respiratory difficulty;Respiratory therapy support as indicated       Tolerating, RR 20, SpO2 93% and improving. Pt responds to voice.  
Pt standing and pacing around bed d/t cramps in stomach from menstrual cycle. Unable to place bed alarm d/t pt continuously standing and sitting at edge of bed. Pt educated on fall precautions and declined to stay seated d/t pain relief. Pt is able to be viewed next to nurses station and educated on fall precautions.     1800: Pt found very tearful stating she does not want to be at the hospital anymore. She has no suicidal ideations or plans of elopement. MD made aware and Trazadone orders placed for nightly. Nasal sprays ordered for nose irritation.    1845: Pt talking on phone with family. States she has no one hear to talk to and wants to see her family. She feels better and appreciative of nursing care and Trazadone orders.     
Pulse oximetry assessment   99% at rest on room air (if 88% or less, skip next steps)  98% while ambulating on room air  100% at rest on 0 LPM  
Spiritual Health History and Assessment/Progress Note  [facility]    Spiritual/Emotional Needs,  ,  ,      Name: Aileen Bonilla MRN: [id number]    Age: 39 y.o.     Sex: female   Language: English   Hoahaoism: None   Acute decompensated heart failure (HCC)     Date: 9/25/2024            Total Time Calculated: 6 min              [address]        Referral/Consult From: Rounding   Encounter Overview/Reason: Spiritual/Emotional Needs  Service Provided For: Patient    Anna, Belief, Meaning:   Patient unable to assess at this time  Family/Friends No family/friends present      Importance and Influence:  Patient unable to assess at this time  Family/Friends No family/friends present    Community:  Patient feels well-supported. Support system includes: Parent/s  Family/Friends No family/friends present    Assessment and Plan of Care:     Patient Interventions include: Provided sacramental/Baptism ritual  Family/Friends Interventions include: No family/friends present    Patient Plan of Care: Spiritual Care available upon further referral  Family/Friends Plan of Care: No family/friends present    Electronically signed by Chaplain Augusto on 9/25/2024 at 8:38 AM   
TRANSFER - IN REPORT:    Verbal report received from GLADYS Caldwell on Aileen Bonilla  being received from ED for routine progression of patient care      Report consisted of patient's Situation, Background, Assessment and   Recommendations(SBAR).     Information from the following report(s) Nurse Handoff Report, ED Encounter Summary, ED SBAR, Adult Overview, Intake/Output, MAR, Recent Results, Med Rec Status, and Neuro Assessment was reviewed with the receiving nurse.    Opportunity for questions and clarification was provided.      Assessment completed upon patient's arrival to unit and care assumed.          UPDATE  Patient came up with BP of 135/77.   0200;Vital signs checked immediately upon arrival and it recorded 164/96. Nitro oint administered per order. Monitoring ongoing.   BP as follows ;  168/92  167/88  160/96  171/85: MD paged for this fluctuation of BP. MD recommended Nitro drip. Nitro drip started at 0631. Monitoring ongoing.  At 0654: BP dropped to 154/89. Per MD order Drip stopped . Monitoring ongoing. Report given to Day RN to follow up for contuinity of care.    Bedside shift change report given to GLADYS Sanford (oncoming nurse) by GLADYS Ortega(offgoing nurse). Report included the following information Nurse Handoff Report, Adult Overview, Intake/Output, MAR, Recent Results, Med Rec Status, Cardiac Rhythm Sinus Tachy, Alarm Parameters, and Neuro Assessment.                
The patient is new to me today.  She was seen and examined at the bedside in follow-up for new onset CHF and acute respiratory failure among other issues.  Patient was on BiPAP.  She appeared tired but was awake and oriented.  She complained of back pain.  She also has a cough but she denied any expectoration or hemoptysis.  Patient also reported bilateral lower extremity edema.  She denied any prior history of similar symptoms.  Patient also denied any prior cardiac history.  She smokes half a pack of cigarettes daily.  She said that she drinks alcohol occasionally but denied any recreational drug use.    Physical exam: Patient is awake but looks tired; she is tachycardic but rhythm is regular; faint inspiratory crackles auscultated bilaterally; 2+ lower extremity pitting edema present; skin is warm and dry      -Patient appears to have new onset CHF ; heart failure could be nonischemic and secondary to alcohol use disorder but, the patient also has risk factors for CAD such as diabetes mellitus and hypertension.  Therefore, ischemic cardiomyopathy needs to be ruled out.  Cardiology will be consulted.  Continue patient on furosemide for diuresis.  Monitor intake and output.  Measure weight on a daily basis.  Continue low-sodium diet.  Await results of 2D echo.  The nitroglycerin infusion has been discontinued.  The patient has been started on amlodipine and isosorbide dinitrate for better blood pressure control.  Hydralazine will be avoided at this time since the patient is already tachycardic.   -Patient also seems to have acute kidney injury which is likely prerenal secondary to cardiorenal syndrome.  Continue management of chronic etiology.  Monitor intake and output.  Avoid use of nephrotoxins including NSAIDs and contrast.  -Continue patient on BiPAP.  Titrate FiO2 to keep oxygen saturation between 93% and 96%.    Please refer to the H&P by my colleague for additional details.   
Tried patient off BiPAP and placed on Heated HFNC  on 100% patient desat to 77 placed back on BiPAP patient slowly SpO2 going up 93%.   Unable to come off bipap      1557 tried patient off Bipap and placed on HHF NC on 100% patient de sat to 48% quick . RT placed patient back on BiPAP.  RN aware .   Patient still unable to come off BiPAP !    1620 RT completed ABG with concerns of hypoxia  results are on BiPAP with 100% fio2   Latest Reference Range & Units 09/25/24 16:15   Set Rate bpm 10   DEVICE -   BAGGING   Site -   RIGHT RADIAL   Mode -   BILEVEL   IPAP/PIP/High PEEP -   17   Respiratory Rate -   21   POC pH 7.35 - 7.45   7.44   POC pCO2 35.0 - 48.0 MMHG 40.5   POC PO2 83 - 108 MMHG 66 (L)   POC HCO3 21 - 28 MMOL/L 27.6   POC O2 SAT 92 - 97 % 93.5   POC Dimas's Test -   Positive   POC TIDAL VOLUME ml 581   FIO2 % 100   POC Pressure Support cmH2O 6   Base Excess mmol/L 3.2   POC PEEP cmH2O 10   (L): Data is abnormally low  
Weaned from BiPAP to HFNC-     09/27/24 0850   Oxygen Therapy/Pulse Ox   O2 Therapy Oxygen humidified   $Oxygen $Daily Charge   O2 Device Heated high flow cannula  (Optiflow HFNC)   O2 Flow Rate (L/min) 60 L/min   FiO2  80 %   Pulse (!) 110   Respirations 14   SpO2 100 %   Pulse Oximeter Device Mode Continuous   $Pulse Oximeter $Spot check (multiple/continuous)       
Units  0-4 Units SubCUTAneous TID     insulin lispro (HUMALOG,ADMELOG) injection vial 0-4 Units  0-4 Units SubCUTAneous Nightly         Intake/Output Summary (Last 24 hours) at 9/27/2024 0839  Last data filed at 9/27/2024 0425  Gross per 24 hour   Intake 240 ml   Output 1500 ml   Net -1260 ml       Physical Exam:  General:  alert, appears stated age, and cooperative  Neck:  no JVD  Lungs:  exp wheezes BL, course BS and rales BL   Heart:  tachy rate, reg rhythm   Abdomen:  abdomen is soft without significant tenderness, masses, organomegaly or guarding  Extremities:  extremities normal, atraumatic, no cyanosis or edema    Visit Vitals  BP (!) 116/56   Pulse (!) 103   Temp 98.1 °F (36.7 °C) (Axillary)   Resp 22   Ht 1.702 m (5' 7\")   Wt 77.6 kg (171 lb 1.6 oz)   SpO2 100%   BMI 26.80 kg/m²       Data Review:     Labs: Results:       Chemistry Recent Labs     09/24/24  2200 09/25/24  0640 09/26/24  0952 09/26/24  1000 09/27/24  0341    137 140  --  144   K 4.7 4.2 3.5  --  3.2*    107 105  --  107   CO2 22 24 29  --  28   BUN 26* 26* 31*  --  42*   CREATININE 1.83* 1.60* 1.71* 1.49* 1.95*   MG 1.8  --   --   --   --    GLOB 4.2* 3.9 4.4*  --   --       CBC w/Diff Recent Labs     09/25/24  0640 09/26/24  0952 09/27/24  0341   WBC 12.4 15.5* 12.7   RBC 2.82* 2.84* 2.57*   HGB 7.0* 7.0* 6.3*   HCT 22.5* 22.6* 20.5*    312 313      Cardiac Enzymes Lab Results   Component Value Date/Time    TROPHS 26 09/25/2024 01:46 PM    TROPHS 42 09/25/2024 06:40 AM    TROPHS 64 09/25/2024 12:09 AM      Coagulation No results for input(s): \"INR\", \"APTT\" in the last 72 hours.    Lipid Panel No results found for: \"CHOL\", \"CHOLPOCT\", \"CHLST\", \"CHOLV\", \"323298\", \"HDL\", \"HDLC\", \"LDL\", \"LDLC\", \"VLDLC\", \"VLDL\"   BNP No results found for: \"NTPROBNP\"   Liver Enzymes Lab Results   Component Value Date    ALT 47 09/26/2024    AST 60 (H) 09/26/2024    ALKPHOS 108 09/26/2024    BILITOT 0.6 09/26/2024      Thyroid Studies Lab 
Q6H PRN    Or    acetaminophen (TYLENOL) suppository 650 mg  650 mg Rectal Q6H PRN    traMADol (ULTRAM) tablet 50 mg  50 mg Oral Q6H PRN    [Held by provider] isosorbide dinitrate (ISORDIL) tablet 10 mg  10 mg Oral TID    insulin lispro (HUMALOG,ADMELOG) injection vial 0-4 Units  0-4 Units SubCUTAneous TID WC    insulin lispro (HUMALOG,ADMELOG) injection vial 0-4 Units  0-4 Units SubCUTAneous Nightly       Jerry Barber DO    October 1, 2024    
use of nephrotoxins including contrast and NSAIDs  Consider nephrology consultation tomorrow if renal function has not improved    4.  Asymptomatic bacteriuria  UA revealed pyuria and bacteriuria but the patient does not have any symptoms of a UTI.     5.  Type 2 diabetes mellitus  Continue patient on Lantus, correction dose insulin and a carbohydrate controlled diet  Check glucose levels before meals and at bedtime    6.  Diabetic gastroparesis  Continue patient on metoclopramide.     7.  Essential hypertension  The patient is currently normotensive, with suspected relative hypotension.  Hold amlodipine and isosorbide dinitrate    8.  Alcohol use disorder  The patient said that she has not had any alcohol since July 2024.  Continue thiamine and folic acid supplementation    9.  Normocytic anemia  Likely acute blood loss anemia secondary to menstrual blood loss, superimposed on anemia of chronic inflammation  Patient was transfused 1 unit of PRBC on September 27, 2024.  Continue to monitor hemoglobin and hematocrit.  Transfuse to a goal hemoglobin of 7.0.  Continue to hold pharmacologic anticoagulants and antiplatelet agents at this time       10.  Lactic acidemia  Likely secondary to hypoxia but lactic acid levels have normalized    11.  Reflux esophagitis  Continue pantoprazole    12.  Diabetic retinopathy  Suspected; patient complains of floaters in her left visual field  She has been advised to follow-up with ophthalmology after discharge    Please contact Dr. Hudson if there are any questions. Greater than 35 minutes were spent reviewing the patient's chart, obtaining a thorough history, performing a physical exam, placing orders and dictating this document.  Findings and plan were also discussed with the patient/patient's family and with RN.  Greater than 50% of the time was spent on direct patient care.                    Signed By: [unfilled]     September 30, 2024 5:14 PM     
MD Mariah on 4/15/2024 10:13 PM        XR (Most Recent). CXR reviewed by me and compared with previous CXR   Xray Result (most recent):  XR CHEST PORTABLE 09/28/2024    Narrative  INDICATION:  See indication above.    COMPARISON: Recent prior.    FINDINGS: A portable AP radiograph of the chest was obtained at 1216 hours. The  patient is on a cardiac monitor. Similar bilateral increased interstitial  markings with perhaps mild interval improvement in aeration right upper lobe and  left lung base with residual opacities remaining. Findings may represent mildly  improving congestion or infiltrates. Recommend continued clinical and imaging  follow-up. Cardiac silhouette and osseous structures are unchanged.    Impression  Perhaps similar to marginally improvement as above.    Electronically signed by Daniela Hughes          See my orders for details     Total care time exclusive of procedures with complex decision making, coordination of care and counseling patient performed and > 50% time spent in face to face evaluation as mentioned above.    Al Cole MD  Critical Care Medicine    
the patient also has stage IIIa chronic kidney disease, likely due to diabetic nephropathy.  Renal function has improved.  Continue management of underlying etiology  Minimize use nephrotoxins including NSAIDs.     4.  Asymptomatic bacteriuria  UA revealed pyuria and bacteriuria but the patient does not have any symptoms of a UTI.     5.  Type 2 diabetes mellitus  Continue patient on Lantus, correction dose insulin and a carbohydrate controlled diet  Check glucose levels before meals and at bedtime    6.  Diabetic gastroparesis  Continue patient on metoclopramide.     7.  Essential hypertension  The dose of amlodipine has been increased.  Continue isosorbide dinitrate.    8.  Alcohol use disorder  The patient said that she has not had any alcohol since July 2024.  Continue thiamine and folic acid supplementation    9.  Normocytic anemia  Likely acute blood loss anemia secondary to menstrual blood loss, superimposed on anemia of chronic inflammation  Patient was transfused 1 unit of PRBC on September 27, 2024.  Continue to monitor hemoglobin and hematocrit.  Transfuse to a goal hemoglobin of 7.0.  Continue to hold pharmacologic anticoagulants and antiplatelet agents at this time       10.  Lactic acidemia  Likely secondary to hypoxia but lactic acid levels have normalized    11.  Reflux esophagitis  Continue pantoprazole    12.  Diabetic retinopathy  Suspected; patient complains of floaters in her left visual field  She has been advised to follow-up with ophthalmology after discharge    Please contact Dr. Hudson if there are any questions. Greater than 35 minutes were spent reviewing the patient's chart, obtaining a thorough history, performing a physical exam, placing orders and dictating this document.  Findings and plan were also discussed with the patient/patient's family and with RN.  Greater than 50% of the time was spent on direct patient care.                    Signed By: [unfilled]     September 29, 2024 
etiology  Minimize use nephrotoxins including NSAIDs.  The dose of furosemide has been decreased.    4.  Asymptomatic bacteriuria  UA revealed pyuria and bacteriuria but the patient does not have any symptoms of a UTI.     5.  Type 2 diabetes mellitus  Continue patient on Lantus, correction dose insulin and a carbohydrate controlled diet  Check glucose levels before meals and at bedtime    6.  Diabetic gastroparesis  Continue patient on metoclopramide.     7.  Essential hypertension  Continue IV metoprolol as needed    8.  Alcohol use disorder  The patient said that she has not had any alcohol since July 2024.  She has been started on thiamine and folic acid supplementation    9.  Normocytic anemia  Likely acute blood loss anemia secondary to menstrual blood loss, superimposed on anemia of chronic inflammation  1 unit of PRBC has been ordered.  Continue to monitor hemoglobin and hematocrit.  Transfuse to a hemoglobin goal of 7.0.  Lovenox has been placed on hold  Check iron panel    10.  Lactic acidemia  Likely secondary to hypoxia but lactic acid levels have normalized    11.  Reflux esophagitis  Patient has been restarted on pantoprazole    12.  Diabetic retinopathy  Suspected; patient complains of floaters in her left visual field  She has been advised to follow-up with ophthalmology after discharge    Please contact Dr. Hudson if there are any questions. Greater than 55 minutes were spent reviewing the patient's chart, obtaining a thorough history, performing a physical exam, placing orders and dictating this document.  Findings and plan were also discussed with the patient/patient's family and with RN.  Greater than 50% of the time was spent on direct patient care.                    Signed By: [unfilled]     September 27, 2024 9:39 AM

## 2024-10-01 NOTE — PLAN OF CARE
Problem: Discharge Planning  Goal: Discharge to home or other facility with appropriate resources  9/25/2024 2215 by Fauzia Rose RN  Outcome: Progressing  Flowsheets  Taken 9/25/2024 1935 by Fauzia Rose RN  Discharge to home or other facility with appropriate resources:   Identify barriers to discharge with patient and caregiver   Arrange for needed discharge resources and transportation as appropriate   Identify discharge learning needs (meds, wound care, etc)  Taken 9/25/2024 0934 by Claribel Burger RN  Discharge to home or other facility with appropriate resources:   Identify barriers to discharge with patient and caregiver   Arrange for needed discharge resources and transportation as appropriate     Problem: Pain  Goal: Verbalizes/displays adequate comfort level or baseline comfort level  9/25/2024 2215 by Fauzia Rose RN  Outcome: Progressing  Flowsheets (Taken 9/25/2024 1930)  Verbalizes/displays adequate comfort level or baseline comfort level:   Encourage patient to monitor pain and request assistance   Assess pain using appropriate pain scale   Administer analgesics based on type and severity of pain and evaluate response   Implement non-pharmacological measures as appropriate and evaluate response     Problem: Skin/Tissue Integrity  Goal: Absence of new skin breakdown  Description: 1.  Monitor for areas of redness and/or skin breakdown  2.  Assess vascular access sites hourly  3.  Every 4-6 hours minimum:  Change oxygen saturation probe site  4.  Every 4-6 hours:  If on nasal continuous positive airway pressure, respiratory therapy assess nares and determine need for appliance change or resting period.  9/25/2024 2215 by Fauzia Rose RN  Outcome: Progressing     Problem: ABCDS Injury Assessment  Goal: Absence of physical injury  9/25/2024 2215 by Fauzia Rose RN  Outcome: Progressing     
  Problem: Discharge Planning  Goal: Discharge to home or other facility with appropriate resources  9/27/2024 0841 by Celine Humphreys RN  Outcome: Progressing  9/27/2024 0428 by Snow Heller RN  Outcome: Progressing  Flowsheets (Taken 9/27/2024 0428)  Discharge to home or other facility with appropriate resources:   Identify barriers to discharge with patient and caregiver   Refer to discharge planning if patient needs post-hospital services based on physician order or complex needs related to functional status, cognitive ability or social support system     Problem: Pain  Goal: Verbalizes/displays adequate comfort level or baseline comfort level  9/27/2024 0841 by Celine Humphreys RN  Outcome: Progressing  9/27/2024 0428 by Snow Heller RN  Flowsheets (Taken 9/27/2024 0428)  Verbalizes/displays adequate comfort level or baseline comfort level:   Encourage patient to monitor pain and request assistance   Assess pain using appropriate pain scale     Problem: Skin/Tissue Integrity  Goal: Absence of new skin breakdown  Description: 1.  Monitor for areas of redness and/or skin breakdown  2.  Assess vascular access sites hourly  3.  Every 4-6 hours minimum:  Change oxygen saturation probe site  4.  Every 4-6 hours:  If on nasal continuous positive airway pressure, respiratory therapy assess nares and determine need for appliance change or resting period.  9/27/2024 0841 by Celine Humphreys RN  Outcome: Progressing  9/27/2024 0428 by Snow Heller RN  Outcome: Progressing     Problem: ABCDS Injury Assessment  Goal: Absence of physical injury  9/27/2024 0841 by Celine Humphreys RN  Outcome: Progressing  9/27/2024 0428 by Snow Heller RN  Flowsheets (Taken 9/27/2024 0428)  Absence of Physical Injury: Implement safety measures based on patient assessment     Problem: Respiratory - Adult  Goal: Achieves optimal ventilation and oxygenation  9/27/2024 0841 by Celine Humphreys RN  Outcome: 
  Problem: Discharge Planning  Goal: Discharge to home or other facility with appropriate resources  9/30/2024 1110 by Ladarius De La Garza RN  Outcome: Progressing  9/30/2024 0630 by Geoffrey Leigh RN  Outcome: Progressing     Problem: Pain  Goal: Verbalizes/displays adequate comfort level or baseline comfort level  9/30/2024 1110 by Ladarius De La Garza RN  Outcome: Progressing  9/30/2024 0630 by Geoffrey Leigh RN  Outcome: Progressing     Problem: Skin/Tissue Integrity  Goal: Absence of new skin breakdown  Description: 1.  Monitor for areas of redness and/or skin breakdown  2.  Assess vascular access sites hourly  3.  Every 4-6 hours minimum:  Change oxygen saturation probe site  4.  Every 4-6 hours:  If on nasal continuous positive airway pressure, respiratory therapy assess nares and determine need for appliance change or resting period.  9/30/2024 1110 by Ladarius De La Garza RN  Outcome: Progressing  9/30/2024 0630 by Geoffrey Leigh RN  Outcome: Progressing     Problem: ABCDS Injury Assessment  Goal: Absence of physical injury  9/30/2024 1110 by Ladarius De La Garza RN  Outcome: Progressing  9/30/2024 0630 by Geoffrey Leigh RN  Outcome: Progressing     Problem: Respiratory - Adult  Goal: Achieves optimal ventilation and oxygenation  9/30/2024 1110 by Ladarius De La Garza RN  Outcome: Progressing  9/30/2024 0630 by Geoffrey Leigh RN  Outcome: Progressing     Problem: Cardiovascular - Adult  Goal: Maintains optimal cardiac output and hemodynamic stability  9/30/2024 1110 by Ladarius De La Garza RN  Outcome: Progressing  Flowsheets (Taken 9/30/2024 0800)  Maintains optimal cardiac output and hemodynamic stability: Monitor blood pressure and heart rate  9/30/2024 0630 by Geoffrey Leigh RN  Outcome: Progressing     Problem: Hematologic - Adult  Goal: Maintains hematologic stability  9/30/2024 1110 by Ladarius De La Garza RN  Outcome: Progressing  Flowsheets 
  Problem: Discharge Planning  Goal: Discharge to home or other facility with appropriate resources  Outcome: Progressing  Flowsheets (Taken 10/1/2024 0850)  Discharge to home or other facility with appropriate resources:   Identify barriers to discharge with patient and caregiver   Identify discharge learning needs (meds, wound care, etc)     Problem: Pain  Goal: Verbalizes/displays adequate comfort level or baseline comfort level  10/1/2024 1037 by Princess Osorio RN  Outcome: Progressing     Problem: Skin/Tissue Integrity  Goal: Absence of new skin breakdown  Description: 1.  Monitor for areas of redness and/or skin breakdown  2.  Assess vascular access sites hourly  3.  Every 4-6 hours minimum:  Change oxygen saturation probe site  4.  Every 4-6 hours:  If on nasal continuous positive airway pressure, respiratory therapy assess nares and determine need for appliance change or resting period.  Outcome: Progressing     Problem: ABCDS Injury Assessment  Goal: Absence of physical injury  Outcome: Progressing     Problem: Safety - Adult  Goal: Free from fall injury  10/1/2024 1037 by Princess Osorio RN  Outcome: Progressing     Problem: Respiratory - Adult  Description: RN educates patient on oxygen needs. Nasal cannula is patent and perfusing oxygen to patient adequately. Keep head of bed above 45 degrees .Monitor O2 stats routinely throughout shift.   Goal: Achieves optimal ventilation and oxygenation  10/1/2024 1037 by Princess Osorio RN  Outcome: Progressing     Problem: Cardiovascular - Adult  Goal: Maintains optimal cardiac output and hemodynamic stability  Outcome: Progressing     Problem: Hematologic - Adult  Goal: Maintains hematologic stability  Outcome: Progressing  Flowsheets (Taken 10/1/2024 0850)  Maintains hematologic stability:   Assess for signs and symptoms of bleeding or hemorrhage   Monitor labs for bleeding or clotting disorders   Administer blood products/factors as ordered     Problem: Chronic 
  Problem: Discharge Planning  Goal: Discharge to home or other facility with appropriate resources  Outcome: Progressing  Flowsheets (Taken 9/25/2024 0934 by Claribel Burger, RN)  Discharge to home or other facility with appropriate resources:   Identify barriers to discharge with patient and caregiver   Arrange for needed discharge resources and transportation as appropriate     Problem: Pain  Goal: Verbalizes/displays adequate comfort level or baseline comfort level  Outcome: Progressing  Flowsheets (Taken 9/25/2024 1930)  Verbalizes/displays adequate comfort level or baseline comfort level:   Encourage patient to monitor pain and request assistance   Assess pain using appropriate pain scale   Administer analgesics based on type and severity of pain and evaluate response   Implement non-pharmacological measures as appropriate and evaluate response     Problem: Skin/Tissue Integrity  Goal: Absence of new skin breakdown  Description: 1.  Monitor for areas of redness and/or skin breakdown  2.  Assess vascular access sites hourly  3.  Every 4-6 hours minimum:  Change oxygen saturation probe site  4.  Every 4-6 hours:  If on nasal continuous positive airway pressure, respiratory therapy assess nares and determine need for appliance change or resting period.  Outcome: Progressing     Problem: ABCDS Injury Assessment  Goal: Absence of physical injury  Outcome: Progressing     
  Problem: Discharge Planning  Goal: Discharge to home or other facility with appropriate resources  Outcome: Progressing  Flowsheets (Taken 9/27/2024 0428)  Discharge to home or other facility with appropriate resources:   Identify barriers to discharge with patient and caregiver   Refer to discharge planning if patient needs post-hospital services based on physician order or complex needs related to functional status, cognitive ability or social support system     Problem: Pain  Goal: Verbalizes/displays adequate comfort level or baseline comfort level  Flowsheets (Taken 9/27/2024 0428)  Verbalizes/displays adequate comfort level or baseline comfort level:   Encourage patient to monitor pain and request assistance   Assess pain using appropriate pain scale     Problem: Skin/Tissue Integrity  Goal: Absence of new skin breakdown  Description: 1.  Monitor for areas of redness and/or skin breakdown  2.  Assess vascular access sites hourly  3.  Every 4-6 hours minimum:  Change oxygen saturation probe site  4.  Every 4-6 hours:  If on nasal continuous positive airway pressure, respiratory therapy assess nares and determine need for appliance change or resting period.  Outcome: Progressing     Problem: ABCDS Injury Assessment  Goal: Absence of physical injury  Flowsheets (Taken 9/27/2024 0428)  Absence of Physical Injury: Implement safety measures based on patient assessment     Problem: Respiratory - Adult  Goal: Achieves optimal ventilation and oxygenation  Flowsheets (Taken 9/27/2024 0428)  Achieves optimal ventilation and oxygenation:   Assess for changes in respiratory status   Oxygen supplementation based on oxygen saturation or arterial blood gases   Initiate smoking cessation protocol as indicated     Problem: Cardiovascular - Adult  Goal: Maintains optimal cardiac output and hemodynamic stability  Outcome: Progressing  Flowsheets (Taken 9/27/2024 0428)  Maintains optimal cardiac output and hemodynamic 
  Problem: Discharge Planning  Goal: Discharge to home or other facility with appropriate resources  Recent Flowsheet Documentation  Taken 9/25/2024 0918 by Claribel Burger RN  Discharge to home or other facility with appropriate resources:   Identify barriers to discharge with patient and caregiver   Arrange for needed discharge resources and transportation as appropriate     
  Problem: Pain  Goal: Verbalizes/displays adequate comfort level or baseline comfort level  10/1/2024 0102 by Annabel Tubbs RN  Outcome: Progressing     Problem: Respiratory - Adult  Goal: Achieves optimal ventilation and oxygenation  10/1/2024 0102 by Annabel Tubbs RN  Outcome: Progressing  Flowsheets (Taken 9/30/2024 2000)  Achieves optimal ventilation and oxygenation:   Assess for changes in respiratory status   Assess for changes in mentation and behavior     Problem: Chronic Conditions and Co-morbidities  Goal: Patient's chronic conditions and co-morbidity symptoms are monitored and maintained or improved  10/1/2024 0102 by Annabel Tubbs RN  Outcome: Progressing  Flowsheets (Taken 9/30/2024 2000)  Care Plan - Patient's Chronic Conditions and Co-Morbidity Symptoms are Monitored and Maintained or Improved:   Monitor and assess patient's chronic conditions and comorbid symptoms for stability, deterioration, or improvement   Collaborate with multidisciplinary team to address chronic and comorbid conditions and prevent exacerbation or deterioration       
  Problem: Pain  Goal: Verbalizes/displays adequate comfort level or baseline comfort level  Outcome: Progressing     Problem: ABCDS Injury Assessment  Goal: Absence of physical injury  Outcome: Progressing     Problem: Chronic Conditions and Co-morbidities  Goal: Patient's chronic conditions and co-morbidity symptoms are monitored and maintained or improved  Outcome: Progressing     Problem: Cardiovascular - Adult  Goal: Maintains optimal cardiac output and hemodynamic stability  Outcome: Progressing     
  Problem: Respiratory - Adult  Goal: Achieves optimal ventilation and oxygenation  Outcome: Progressing     Problem: Cardiovascular - Adult  Goal: Maintains optimal cardiac output and hemodynamic stability  Outcome: Progressing     Problem: Hematologic - Adult  Goal: Maintains hematologic stability  Outcome: Progressing     Problem: Chronic Conditions and Co-morbidities  Goal: Patient's chronic conditions and co-morbidity symptoms are monitored and maintained or improved  Outcome: Progressing     
needed   Assess and instruct to report shortness of breath or any respiratory difficulty   Respiratory therapy support as indicated     Problem: Cardiovascular - Adult  Goal: Maintains optimal cardiac output and hemodynamic stability  Outcome: Progressing     Problem: Hematologic - Adult  Goal: Maintains hematologic stability  Outcome: Progressing     Problem: Chronic Conditions and Co-morbidities  Goal: Patient's chronic conditions and co-morbidity symptoms are monitored and maintained or improved  Outcome: Progressing

## 2024-10-01 NOTE — DISCHARGE SUMMARY
Discharge Summary    Date of Service:  10/01/2024  Admission Date:  9/24/2024  Discharge Date:  10/01/2024  Attending:  Jerry Barber D.O.  PCP:  Nik Linn N.P.    Admission Diagnoses:  Acute Hypoxic Respiratory Failure, suspect 2°/2 to New Onset Acute Decompensated Heart Failure  2.   Chronic Renal Insufficiency, Stage IIIB  3.   Diabetes mellitus, with Gastroparesis  4.   Alcoholism, she presented from rehab    Discharge Diagnoses:  Acute Heart Failure with Preserved Ejection Fraction (a.k.a. HFpEF)  2.   Acute Hypoxic Respiratory Failure 2°/2 Pulmonary Edema (Resolved)  3.   Lactic Acidemia (Resolved)  4.   Acute Kidney Injury (a.k.a. HILDA) Stage 1 2°/2 Cardiorenal Syndrome on Chronic Kidney Disease (a.k.a. CKD) Stage III  5.   Asymptomatic Bacteriuria  6.   Hypokalemia  7.   Essential Hypertension  8.   Alcohol Use Disorder in Remission  9.   Normocytic Anemia S/P Transfusion of 1 Unit of PRBCs (on 9/27/2024)  10.  Gastroesophageal Reflux Disease (a.k.a. GERD) with History of Esophagitis (?)  11.  Diabetes Mellitus Type 2 without Long-Term Use of Insulin with Diabetic Retinopathy and Diabetic Gastroparesis      Consults:  Cardiological services (Len Hughes M.D.). Pulmonological services (Crhistopher Bishop M.D.)  Procedures:  Transfusion of 1 Unit of PRBCs on 9/27/2024    Diagnostic Imaging:  XR CHEST PORTABLE  Order: [id number]  Status: Final result       Visible to patient: No (not released)       Next appt: None    0 Result Notes  Details    Reading Physician Reading Date Result Priority   Sebas Jiménez MD  [phone] 9/24/2024      Narrative & Impression  XR CHEST PORTABLE        INDICATION: shortness of breath.     TECHNIQUE:   No prior studies.     FINDINGS:  Moderate cardiomegaly. Severe pulmonary edema. Superimposed infiltrate not  excluded. Small bilateral pleural effusion..     IMPRESSION:     1. Cardiomegaly and severe pulmonary edema. Superimposed infiltrate/aspiration  not